# Patient Record
Sex: MALE | Race: WHITE | NOT HISPANIC OR LATINO
[De-identification: names, ages, dates, MRNs, and addresses within clinical notes are randomized per-mention and may not be internally consistent; named-entity substitution may affect disease eponyms.]

---

## 2017-06-06 ENCOUNTER — APPOINTMENT (OUTPATIENT)
Dept: GASTROENTEROLOGY | Facility: CLINIC | Age: 77
End: 2017-06-06

## 2017-06-06 VITALS
OXYGEN SATURATION: 98 % | HEIGHT: 66 IN | HEART RATE: 87 BPM | BODY MASS INDEX: 28.28 KG/M2 | WEIGHT: 176 LBS | DIASTOLIC BLOOD PRESSURE: 70 MMHG | TEMPERATURE: 98.6 F | SYSTOLIC BLOOD PRESSURE: 110 MMHG

## 2017-06-06 DIAGNOSIS — Z86.79 PERSONAL HISTORY OF OTHER DISEASES OF THE CIRCULATORY SYSTEM: ICD-10-CM

## 2017-06-06 DIAGNOSIS — Z95.818 PRESENCE OF OTHER CARDIAC IMPLANTS AND GRAFTS: ICD-10-CM

## 2017-06-06 DIAGNOSIS — K57.92 DIVERTICULITIS OF INTESTINE, PART UNSPECIFIED, W/OUT PERFORATION OR ABSCESS W/OUT BLEEDING: ICD-10-CM

## 2017-06-06 DIAGNOSIS — Z82.3 FAMILY HISTORY OF STROKE: ICD-10-CM

## 2017-06-06 DIAGNOSIS — R14.3 FLATULENCE: ICD-10-CM

## 2017-06-06 RX ORDER — ATORVASTATIN CALCIUM 20 MG/1
20 TABLET, FILM COATED ORAL
Refills: 0 | Status: DISCONTINUED | COMMUNITY

## 2017-07-12 ENCOUNTER — APPOINTMENT (OUTPATIENT)
Dept: GASTROENTEROLOGY | Facility: AMBULATORY MEDICAL SERVICES | Age: 77
End: 2017-07-12

## 2017-07-25 ENCOUNTER — APPOINTMENT (OUTPATIENT)
Dept: GASTROENTEROLOGY | Facility: CLINIC | Age: 77
End: 2017-07-25

## 2017-07-25 VITALS
SYSTOLIC BLOOD PRESSURE: 138 MMHG | TEMPERATURE: 98.5 F | HEIGHT: 66 IN | WEIGHT: 180 LBS | OXYGEN SATURATION: 98 % | HEART RATE: 87 BPM | DIASTOLIC BLOOD PRESSURE: 82 MMHG | BODY MASS INDEX: 28.93 KG/M2

## 2017-10-31 ENCOUNTER — APPOINTMENT (OUTPATIENT)
Dept: POPULATION HEALTH | Facility: CLINIC | Age: 77
End: 2017-10-31
Payer: OTHER MISCELLANEOUS

## 2017-10-31 VITALS
HEIGHT: 66 IN | DIASTOLIC BLOOD PRESSURE: 63 MMHG | WEIGHT: 173 LBS | HEART RATE: 62 BPM | SYSTOLIC BLOOD PRESSURE: 139 MMHG | BODY MASS INDEX: 27.8 KG/M2 | OXYGEN SATURATION: 98 % | RESPIRATION RATE: 19 BRPM | TEMPERATURE: 98 F

## 2017-10-31 PROCEDURE — 94010 BREATHING CAPACITY TEST: CPT

## 2017-10-31 PROCEDURE — 99214 OFFICE O/P EST MOD 30 MIN: CPT | Mod: 25

## 2018-06-12 ENCOUNTER — APPOINTMENT (OUTPATIENT)
Dept: GASTROENTEROLOGY | Facility: CLINIC | Age: 78
End: 2018-06-12
Payer: MEDICARE

## 2018-06-12 VITALS
OXYGEN SATURATION: 97 % | TEMPERATURE: 98.3 F | WEIGHT: 167 LBS | DIASTOLIC BLOOD PRESSURE: 73 MMHG | RESPIRATION RATE: 18 BRPM | SYSTOLIC BLOOD PRESSURE: 128 MMHG | HEART RATE: 65 BPM | BODY MASS INDEX: 26.84 KG/M2 | HEIGHT: 66 IN

## 2018-06-12 DIAGNOSIS — K59.09 OTHER CONSTIPATION: ICD-10-CM

## 2018-06-12 DIAGNOSIS — R19.5 OTHER FECAL ABNORMALITIES: ICD-10-CM

## 2018-06-12 PROCEDURE — 99214 OFFICE O/P EST MOD 30 MIN: CPT

## 2018-06-21 LAB — HEMOCCULT STL QL IA: NEGATIVE

## 2018-10-30 ENCOUNTER — APPOINTMENT (OUTPATIENT)
Dept: POPULATION HEALTH | Facility: CLINIC | Age: 78
End: 2018-10-30
Payer: OTHER MISCELLANEOUS

## 2018-10-30 VITALS
SYSTOLIC BLOOD PRESSURE: 136 MMHG | BODY MASS INDEX: 26.36 KG/M2 | WEIGHT: 164 LBS | OXYGEN SATURATION: 97 % | HEIGHT: 66 IN | DIASTOLIC BLOOD PRESSURE: 63 MMHG | RESPIRATION RATE: 16 BRPM | HEART RATE: 72 BPM | TEMPERATURE: 97.9 F

## 2018-10-30 PROCEDURE — 99214 OFFICE O/P EST MOD 30 MIN: CPT | Mod: 25

## 2018-10-30 PROCEDURE — 94010 BREATHING CAPACITY TEST: CPT

## 2019-04-23 ENCOUNTER — OUTPATIENT (OUTPATIENT)
Dept: OUTPATIENT SERVICES | Facility: HOSPITAL | Age: 79
LOS: 1 days | Discharge: HOME | End: 2019-04-23

## 2019-04-24 DIAGNOSIS — F10.20 ALCOHOL DEPENDENCE, UNCOMPLICATED: ICD-10-CM

## 2019-11-19 ENCOUNTER — APPOINTMENT (OUTPATIENT)
Dept: POPULATION HEALTH | Facility: CLINIC | Age: 79
End: 2019-11-19
Payer: OTHER MISCELLANEOUS

## 2019-11-19 VITALS
SYSTOLIC BLOOD PRESSURE: 157 MMHG | TEMPERATURE: 97.9 F | HEART RATE: 74 BPM | RESPIRATION RATE: 16 BRPM | OXYGEN SATURATION: 98 % | WEIGHT: 168 LBS | BODY MASS INDEX: 27.99 KG/M2 | DIASTOLIC BLOOD PRESSURE: 75 MMHG | HEIGHT: 65 IN

## 2019-11-19 PROCEDURE — 99214 OFFICE O/P EST MOD 30 MIN: CPT | Mod: 25

## 2019-11-19 PROCEDURE — 94010 BREATHING CAPACITY TEST: CPT

## 2019-11-19 NOTE — HISTORY OF PRESENT ILLNESS
[FreeTextEntry1] : Mr. Figueroa returns for his annual visit. Feeling well, walking twice a day and looking forward to his 12 weeks in [place] from January to April. No chest pain, shortness of breath, pleuritic pain, wheezing, change in bowel habits. Has post nasal drip leading him to clear his throat. Has been seen by ENT and given saline spray with minimal results but is only using at night. No other health changes in past year.

## 2019-11-19 NOTE — ASSESSMENT
[FreeTextEntry1] : 79 year old with asbestos exposure here for annual examination. Doing well, active, walking daily and no respiratory complaints. Has cough/clearing of throat related to PND - on saline spray and advised to use it frequently. Has sciatica and is seeing physical therapist. Pulmonary function tests are normal. For CT scan. RTC one year

## 2019-11-19 NOTE — REVIEW OF SYSTEMS
[Nasal Discharge] : nasal discharge [see HPI] : see HPI [Joint Pain] : joint pain [Negative] : Neurological

## 2019-11-19 NOTE — PHYSICAL EXAM
[General Appearance - In No Acute Distress] : in no acute distress [General Appearance - Alert] : alert [General Appearance - Well Nourished] : well nourished [PERRL With Normal Accommodation] : pupils were equal in size, round, and reactive to light [Conjunctival Hyperemia Left] : hyperemia [Extraocular Movements] : extraocular movements were intact [Erythema] : erythema [Exudate] : exudate [Neck Appearance] : the appearance of the neck was normal [Auscultation Breath Sounds / Voice Sounds] : lungs were clear to auscultation bilaterally [] : no respiratory distress [Heart Rate And Rhythm] : heart rate was normal and rhythm regular [Heart Sounds] : normal S1 and S2 [Heart Sounds Gallop] : no gallops [Murmurs] : no murmurs [Heart Sounds Pericardial Friction Rub] : no pericardial rub [Edema] : there was no peripheral edema [Bowel Sounds] : normal bowel sounds [Abdomen Soft] : soft [Abdomen Mass (___ Cm)] : no abdominal mass palpated [Abdomen Tenderness] : non-tender [Cervical Lymph Nodes Enlarged Posterior Bilaterally] : posterior cervical [Cervical Lymph Nodes Enlarged Anterior Bilaterally] : anterior cervical [Left] : on the left [Supraclavicular Lymph Nodes Enlarged Bilaterally] : supraclavicular [Mobile] : mobile [Size ___ cm] : measuring [unfilled]Ucm [Rubbery] : rubbery [Deep Tendon Reflexes (DTR)] : deep tendon reflexes were 2+ and symmetric [Abnormal Walk] : normal gait [Sensation] : the sensory exam was normal to light touch and pinprick [No Focal Deficits] : no focal deficits [Oriented To Time, Place, And Person] : oriented to person, place, and time [Affect] : the affect was normal [Impaired Insight] : insight and judgment were intact [FreeTextEntry1] : some nasal congestion noted, throat is red and irritated due to coughing

## 2019-11-26 ENCOUNTER — APPOINTMENT (OUTPATIENT)
Dept: POPULATION HEALTH | Facility: CLINIC | Age: 79
End: 2019-11-26

## 2020-08-11 ENCOUNTER — EMERGENCY (EMERGENCY)
Facility: HOSPITAL | Age: 80
LOS: 1 days | Discharge: HOME | End: 2020-08-11
Attending: STUDENT IN AN ORGANIZED HEALTH CARE EDUCATION/TRAINING PROGRAM | Admitting: STUDENT IN AN ORGANIZED HEALTH CARE EDUCATION/TRAINING PROGRAM
Payer: MEDICARE

## 2020-08-11 VITALS
HEART RATE: 71 BPM | DIASTOLIC BLOOD PRESSURE: 78 MMHG | RESPIRATION RATE: 18 BRPM | TEMPERATURE: 97 F | SYSTOLIC BLOOD PRESSURE: 173 MMHG | OXYGEN SATURATION: 100 %

## 2020-08-11 VITALS
OXYGEN SATURATION: 100 % | DIASTOLIC BLOOD PRESSURE: 57 MMHG | HEART RATE: 66 BPM | SYSTOLIC BLOOD PRESSURE: 102 MMHG | TEMPERATURE: 98 F | RESPIRATION RATE: 18 BRPM

## 2020-08-11 PROCEDURE — 99284 EMERGENCY DEPT VISIT MOD MDM: CPT | Mod: GC

## 2020-08-11 PROCEDURE — 70450 CT HEAD/BRAIN W/O DYE: CPT | Mod: 26

## 2020-08-11 NOTE — ED PROVIDER NOTE - NS ED ROS FT
Gen: Denies fever, weight loss  HEENT: Denies vision changes, ear pain, epistaxis, sore throat  CV: Denies chest pain, palpitations  Skin: Denies rash, erythema, color changes  Resp: Denies SOB, cough  Endo: Denies sensitivity to heat, cold, increased urination  GI: Denies abdominal pain, constipation, nausea, vomiting, diarrhea  Msk: Denies back pain, LE swelling, extremity pain  : Denies dysuria, increased frequency  Neuro: Denies LOC, weakness, numbness, tingling, HA  Psych: Denies hx of psych, hallucinations  ROS statement: all other ROS negative except as per HPI

## 2020-08-11 NOTE — ED PROVIDER NOTE - CARE PLAN
Principal Discharge DX:	ETOH abuse Principal Discharge DX:	ETOH abuse  Secondary Diagnosis:	Closed head injury

## 2020-08-11 NOTE — ED PROVIDER NOTE - NSFOLLOWUPINSTRUCTIONS_ED_ALL_ED_FT
Alcohol Abuse    Alcohol intoxication occurs when the amount of alcohol that a person has consumed impairs his or her ability to mentally and physically function. Chronic alcohol consumption can also lead to a variety of health issues including neurological disease, stomach disease, heart disease, liver disease, etc. Do not drive after drinking alcohol. Drinking enough alcohol to end up in an Emergency Room suggests you may have an alcohol abuse problem. Seek help at a drug addiction center.    SEEK IMMEDIATE MEDICAL CARE IF YOU HAVE ANY OF THE FOLLOWING SYMPTOMS: seizures, vomiting blood, blood in your stool, lightheadedness/dizziness, or becoming shaky to tremulous when you stop drinking.    Doctor recommendation is to refrain from drinking alcohol and from driving.

## 2020-08-11 NOTE — ED PROVIDER NOTE - PHYSICAL EXAMINATION
PHYSICAL EXAM:  GENERAL: non-toxic appearing; in no respiratory distress; alcohol on breathe  HEAD: Atraumatic, Normocephalic, ecchymosis to Rt cheek.  EYES: PERRL, EOMs intact b/l w/out deficits, no conjunctival pallor  NECK: No JVD; FROM  CHEST/LUNG: CTAB no wheezes/rhonchi/rales  HEART: RRR no murmur/gallops/rubs  ABDOMEN: +BS, soft, NT, ND  EXTREMITIES: No LE edema, +2 radial pulses b/l, +2 DP/PT pulses b/l  MUSCULOSKELETAL: FROM of all 4 extremities  NERVOUS SYSTEM:  Awake & Alert; Oriented to person; No motor deficits or sensory deficits; no focal neurologic deficits  SKIN:  No new rashes

## 2020-08-11 NOTE — ED PROVIDER NOTE - OBJECTIVE STATEMENT
This is a 79 y/o M, PMH of Afib (on eliquis), BIBEMS for ETOH intoxication. Per patient he drank a couple of bottle of wine. He is oriented to person, but not to place or time. He has a bruise to his face, but denies trauma/fall/injury. Claims he doesn't remember much of what happened. He has been drinking alcohol for 50 years now. He lives with his wife. Was given a bolus of IVF via EMS. Denies every going through alcohol withdrawal. Denies every having seizures due to withdrawal. Denies tremors, head injury, fevers, n/v/d, HA, CP, or abd pain.

## 2020-08-11 NOTE — ED ADULT NURSE REASSESSMENT NOTE - NS ED NURSE REASSESS COMMENT FT1
Pt still appears intoxicated. Pt restless, with slurred speech, Pt compliant with safety measures. will continue to monitor.

## 2020-08-11 NOTE — ED ADULT NURSE NOTE - OBJECTIVE STATEMENT
pt received in room 28 intoxicated, unsteady gait, slurred speech. pt brought in by EMS for intoxication found in the street. pt arrives with 18G to his left ac, with fluids running. Pt unable to state how much he had to drink today. pt denies chest pain, sob, discomfort. pt has bruising to his left cheek, states it happened last week unable to recall how it happened.  pt denies SI/HI. MD at bedside evaluating pt. Safety measures in place. will continue to monitor. belongings across from 21.

## 2020-08-11 NOTE — ED PROVIDER NOTE - ATTENDING CONTRIBUTION TO CARE
80M with pmh afib on eliquis, gout presenting for public intoxication BIB EMS. States he had about 4-5 glasses of wine.  is not usually a heavy drinker but has been drinking more often lately. Endorses fall last week with face abrasion in setting of intoxication. Today isnt sure how he got to the hospital. Per spouse over phone, she left separately from home with patient and was supposed to meet her at [facility] today but never showed up. Denies fever, chills, cp, sob, nausea, vomiting, diarrhea, dysuria, headache, weakness, numbness    GEN: NAD, intoxicated   HEENT: NCAT, MMM, normal conjunctiva, perrl  CHEST/LUNGS: Non-tachypneic, CTAB, bilateral breath sounds  CARDIAC: Non-tachycardic, s1s2, normal perfusion, no peripheral edema  ABDOMEN: Soft, NTND, No rebound/guarding  MSK: No joint tenderness, no gross deformity of extremities  SKIN: slight abrasion to right cheek, No rashes, no petechiae, no vesicles  NEURO: CN grossly intact, normal coordination, no focal motor or sensory deficits  PSYCH: Alert, mild intoxication, cooperative. Denies SI/HI/hallucinations     Patient presenting with public intoxication. No signs of acute injury. Some signs of previous head trauma from fall last week. Given AC on board will obtain screening CT head to r/o intracranial bleed.

## 2020-08-11 NOTE — ED ADULT TRIAGE NOTE - CHIEF COMPLAINT QUOTE
PT brought in by EMS from street for ETOH intoxication. PT states was drinking last night had "a couple of bottles of wine". Denies ETOH use today but + AOB. Denies falls, injury or trauma, denies any pain. PT A&Ox4, calm and cooperative in triage. PT arrives with 18 G IV to L Ac with 1 L NS infusing.

## 2020-08-11 NOTE — ED PROVIDER NOTE - PATIENT PORTAL LINK FT
You can access the FollowMyHealth Patient Portal offered by [facility] by registering at the following website: [email or web address]. By joining [employer]s FollowMyHealth portal, you will also be able to view your health information using other applications (apps) compatible with our system.

## 2020-08-11 NOTE — ED PROVIDER NOTE - CLINICAL SUMMARY MEDICAL DECISION MAKING FREE TEXT BOX
81 y/o M, hx of Afib (on eliquis), BIBEMS for ETOH intoxication.  Here VSS. Non-toxic appearing. Is not confused, but still drunk.   Pt on blood thinners and ecchymosis indicates possible head injury. Patient does not remember an injury. Due to this history, will obtain a CT Head.  Otherwise will observe pt and reassess when sober.   If sober and CT Head negative, will likely d/c home.

## 2020-08-11 NOTE — ED PROVIDER NOTE - PROGRESS NOTE DETAILS
Resident: Jace Alvarado - CT Head shows soft tissue swelling, but negative for acute bleed. Patient has sobered up a bit. He is medically cleared to d/c home. Discussed with family and they will come and pick him up.

## 2020-08-11 NOTE — ED ADULT NURSE NOTE - NSIMPLEMENTINTERV_GEN_ALL_ED
Implemented All Fall Risk Interventions:  [place] to call system. Call bell, personal items and telephone within reach. Instruct patient to call for assistance. Room bathroom lighting operational. Non-slip footwear when patient is off stretcher. Physically safe environment: no spills, clutter or unnecessary equipment. Stretcher in lowest position, wheels locked, appropriate side rails in place. Provide visual cue, wrist band, yellow gown, etc. Monitor gait and stability. Monitor for mental status changes and reorient to person, place, and time. Review medications for side effects contributing to fall risk. Reinforce activity limits and safety measures with patient and family.

## 2020-11-17 ENCOUNTER — APPOINTMENT (OUTPATIENT)
Dept: POPULATION HEALTH | Facility: CLINIC | Age: 80
End: 2020-11-17
Payer: OTHER MISCELLANEOUS

## 2020-11-17 VITALS
BODY MASS INDEX: 26.99 KG/M2 | SYSTOLIC BLOOD PRESSURE: 132 MMHG | DIASTOLIC BLOOD PRESSURE: 70 MMHG | HEART RATE: 62 BPM | TEMPERATURE: 96.8 F | WEIGHT: 162 LBS | HEIGHT: 65 IN | RESPIRATION RATE: 15 BRPM | OXYGEN SATURATION: 95 %

## 2020-11-17 DIAGNOSIS — Z23 ENCOUNTER FOR IMMUNIZATION: ICD-10-CM

## 2020-11-17 PROBLEM — M10.9 GOUT, UNSPECIFIED: Chronic | Status: ACTIVE | Noted: 2020-08-11

## 2020-11-17 PROBLEM — I48.91 UNSPECIFIED ATRIAL FIBRILLATION: Chronic | Status: ACTIVE | Noted: 2020-08-11

## 2020-11-17 PROCEDURE — 99213 OFFICE O/P EST LOW 20 MIN: CPT

## 2020-11-17 PROCEDURE — 99072 ADDL SUPL MATRL&STAF TM PHE: CPT

## 2020-11-17 NOTE — PHYSICAL EXAM
[General Appearance - Alert] : alert [General Appearance - In No Acute Distress] : in no acute distress [General Appearance - Well Nourished] : well nourished [PERRL With Normal Accommodation] : pupils were equal in size, round, and reactive to light [Extraocular Movements] : extraocular movements were intact [Conjunctival Hyperemia Left] : hyperemia [Erythema] : erythema [Exudate] : exudate [Neck Appearance] : the appearance of the neck was normal [] : no respiratory distress [Auscultation Breath Sounds / Voice Sounds] : lungs were clear to auscultation bilaterally [Heart Rate And Rhythm] : heart rate was normal and rhythm regular [Heart Sounds] : normal S1 and S2 [Heart Sounds Gallop] : no gallops [Murmurs] : no murmurs [Heart Sounds Pericardial Friction Rub] : no pericardial rub [Edema] : there was no peripheral edema [Bowel Sounds] : normal bowel sounds [Abdomen Soft] : soft [Abdomen Tenderness] : non-tender [Abdomen Mass (___ Cm)] : no abdominal mass palpated [Cervical Lymph Nodes Enlarged Posterior Bilaterally] : posterior cervical [Cervical Lymph Nodes Enlarged Anterior Bilaterally] : anterior cervical [Supraclavicular Lymph Nodes Enlarged Bilaterally] : supraclavicular [Left] : on the left [Size ___ cm] : measuring [unfilled]Ucm [Mobile] : mobile [Rubbery] : rubbery [Abnormal Walk] : normal gait [Deep Tendon Reflexes (DTR)] : deep tendon reflexes were 2+ and symmetric [Sensation] : the sensory exam was normal to light touch and pinprick [No Focal Deficits] : no focal deficits [Oriented To Time, Place, And Person] : oriented to person, place, and time [Impaired Insight] : insight and judgment were intact [Affect] : the affect was normal [FreeTextEntry1] : some nasal congestion noted, throat is red and irritated due to coughing

## 2021-11-16 ENCOUNTER — APPOINTMENT (OUTPATIENT)
Dept: POPULATION HEALTH | Facility: CLINIC | Age: 81
End: 2021-11-16
Payer: OTHER MISCELLANEOUS

## 2021-11-16 VITALS
HEIGHT: 66 IN | DIASTOLIC BLOOD PRESSURE: 72 MMHG | RESPIRATION RATE: 16 BRPM | BODY MASS INDEX: 26.68 KG/M2 | TEMPERATURE: 98.2 F | OXYGEN SATURATION: 98 % | SYSTOLIC BLOOD PRESSURE: 129 MMHG | WEIGHT: 166 LBS | HEART RATE: 72 BPM

## 2021-11-16 PROCEDURE — 99072 ADDL SUPL MATRL&STAF TM PHE: CPT

## 2021-11-16 PROCEDURE — 99214 OFFICE O/P EST MOD 30 MIN: CPT

## 2021-11-16 NOTE — PHYSICAL EXAM
[General Appearance - Alert] : alert [General Appearance - In No Acute Distress] : in no acute distress [General Appearance - Well Nourished] : well nourished [PERRL With Normal Accommodation] : pupils were equal in size, round, and reactive to light [Extraocular Movements] : extraocular movements were intact [Conjunctival Hyperemia Left] : hyperemia [Erythema] : erythema [Exudate] : exudate [Neck Appearance] : the appearance of the neck was normal [] : no respiratory distress [Decreased Breath Sounds] : decreased breath sounds [Heart Rate And Rhythm] : heart rate was normal and rhythm regular [Heart Sounds] : normal S1 and S2 [Heart Sounds Gallop] : no gallops [Murmurs] : no murmurs [Heart Sounds Pericardial Friction Rub] : no pericardial rub [Edema] : there was no peripheral edema [Bowel Sounds] : normal bowel sounds [Abdomen Soft] : soft [Abdomen Tenderness] : non-tender [Abdomen Mass (___ Cm)] : no abdominal mass palpated [Cervical Lymph Nodes Enlarged Anterior Bilaterally] : anterior cervical [Supraclavicular Lymph Nodes Enlarged Bilaterally] : supraclavicular [Axillary Lymph Nodes Enlarged Bilaterally] : axillary [Left] : on the left [Size ___ cm] : measuring [unfilled]Ucm [Mobile] : mobile [Rubbery] : rubbery [Abnormal Walk] : normal gait [Deep Tendon Reflexes (DTR)] : deep tendon reflexes were 2+ and symmetric [Sensation] : the sensory exam was normal to light touch and pinprick [No Focal Deficits] : no focal deficits [Oriented To Time, Place, And Person] : oriented to person, place, and time [Impaired Insight] : insight and judgment were intact [Affect] : the affect was normal [FreeTextEntry1] : decreased memory with repetition

## 2021-11-16 NOTE — HISTORY OF PRESENT ILLNESS
[FreeTextEntry1] : returns for followup visit. Had CT scan last year and stable. Has had appendectomy in past year, recently had loop recorder removed. Is having cognitive loss with short term memory impairment, worsening. No complaints of shortness of breath and remains physically active. Continues to mow lawn, lift weights, goes for walks all without issue. Has persistent throat clearing, cough x years. No other complaints.

## 2022-05-10 ENCOUNTER — APPOINTMENT (OUTPATIENT)
Dept: GASTROENTEROLOGY | Facility: CLINIC | Age: 82
End: 2022-05-10
Payer: MEDICARE

## 2022-05-10 VITALS
TEMPERATURE: 98.2 F | HEART RATE: 64 BPM | BODY MASS INDEX: 26.68 KG/M2 | WEIGHT: 166 LBS | OXYGEN SATURATION: 97 % | HEIGHT: 66 IN | SYSTOLIC BLOOD PRESSURE: 153 MMHG | DIASTOLIC BLOOD PRESSURE: 79 MMHG

## 2022-05-10 VITALS
WEIGHT: 166 LBS | HEIGHT: 66 IN | DIASTOLIC BLOOD PRESSURE: 79 MMHG | BODY MASS INDEX: 26.68 KG/M2 | SYSTOLIC BLOOD PRESSURE: 153 MMHG | OXYGEN SATURATION: 97 % | TEMPERATURE: 98.2 F | HEART RATE: 64 BPM

## 2022-05-10 DIAGNOSIS — I48.91 UNSPECIFIED ATRIAL FIBRILLATION: ICD-10-CM

## 2022-05-10 DIAGNOSIS — Z12.11 ENCOUNTER FOR SCREENING FOR MALIGNANT NEOPLASM OF COLON: ICD-10-CM

## 2022-05-10 DIAGNOSIS — Z01.812 ENCOUNTER FOR PREPROCEDURAL LABORATORY EXAMINATION: ICD-10-CM

## 2022-05-10 DIAGNOSIS — Z86.39 PERSONAL HISTORY OF OTHER ENDOCRINE, NUTRITIONAL AND METABOLIC DISEASE: ICD-10-CM

## 2022-05-10 DIAGNOSIS — Z20.822 ENCOUNTER FOR PREPROCEDURAL LABORATORY EXAMINATION: ICD-10-CM

## 2022-05-10 DIAGNOSIS — Z12.12 ENCOUNTER FOR SCREENING FOR MALIGNANT NEOPLASM OF COLON: ICD-10-CM

## 2022-05-10 DIAGNOSIS — R09.82 POSTNASAL DRIP: ICD-10-CM

## 2022-05-10 DIAGNOSIS — K21.9 GASTRO-ESOPHAGEAL REFLUX DISEASE W/OUT ESOPHAGITIS: ICD-10-CM

## 2022-05-10 DIAGNOSIS — K22.70 BARRETT'S ESOPHAGUS W/OUT DYSPLASIA: ICD-10-CM

## 2022-05-10 PROCEDURE — 99204 OFFICE O/P NEW MOD 45 MIN: CPT

## 2022-05-10 RX ORDER — ALPRAZOLAM 0.25 MG/1
0.25 TABLET ORAL
Qty: 90 | Refills: 0 | Status: DISCONTINUED | COMMUNITY
Start: 2018-05-09 | End: 2022-05-10

## 2022-05-10 RX ORDER — CHROMIUM 200 MCG
TABLET ORAL
Refills: 0 | Status: ACTIVE | COMMUNITY

## 2022-05-10 RX ORDER — VERAPAMIL HYDROCHLORIDE 240 MG/1
240 TABLET ORAL
Qty: 45 | Refills: 0 | Status: DISCONTINUED | COMMUNITY
Start: 2018-04-07 | End: 2022-05-10

## 2022-05-10 RX ORDER — ATORVASTATIN CALCIUM 20 MG/1
20 TABLET, FILM COATED ORAL
Refills: 0 | Status: ACTIVE | COMMUNITY

## 2022-05-10 RX ORDER — VERAPAMIL HYDROCHLORIDE 120 MG/1
120 TABLET ORAL
Refills: 0 | Status: ACTIVE | COMMUNITY

## 2022-05-10 RX ORDER — APIXABAN 5 MG/1
5 TABLET, FILM COATED ORAL
Qty: 1 | Refills: 0 | Status: ACTIVE | COMMUNITY
Start: 2022-05-10

## 2022-05-10 RX ORDER — BENZOCAINE/BENZALKONIUM/ALOE/E 5 %-0.13 %
10 CREAM (GRAM) TOPICAL
Refills: 0 | Status: ACTIVE | COMMUNITY

## 2022-05-10 RX ORDER — AMITRIPTYLINE HYDROCHLORIDE 10 MG/1
10 TABLET, FILM COATED ORAL
Qty: 30 | Refills: 0 | Status: DISCONTINUED | COMMUNITY
Start: 2018-05-14 | End: 2022-05-10

## 2022-05-10 NOTE — PHYSICAL EXAM
[Sclera] : the sclera and conjunctiva were normal [PERRL With Normal Accommodation] : pupils were equal in size, round, and reactive to light [Extraocular Movements] : extraocular movements were intact [Outer Ear] : the ears and nose were normal in appearance [Neck Appearance] : the appearance of the neck was normal [Neck Cervical Mass (___cm)] : no neck mass was observed [Jugular Venous Distention Increased] : there was no jugular-venous distention [Thyroid Diffuse Enlargement] : the thyroid was not enlarged [Thyroid Nodule] : there were no palpable thyroid nodules [Auscultation Breath Sounds / Voice Sounds] : lungs were clear to auscultation bilaterally [Heart Rate And Rhythm] : heart rate was normal and rhythm regular [Heart Sounds] : normal S1 and S2 [Heart Sounds Gallop] : no gallops [Murmurs] : no murmurs [Heart Sounds Pericardial Friction Rub] : no pericardial rub [Bowel Sounds] : normal bowel sounds [Abdomen Soft] : soft [Abdomen Tenderness] : non-tender [] : no hepato-splenomegaly [Abdomen Mass (___ Cm)] : no abdominal mass palpated [Cervical Lymph Nodes Enlarged Posterior Bilaterally] : posterior cervical [Cervical Lymph Nodes Enlarged Anterior Bilaterally] : anterior cervical [Supraclavicular Lymph Nodes Enlarged Bilaterally] : supraclavicular [No CVA Tenderness] : no ~M costovertebral angle tenderness [No Spinal Tenderness] : no spinal tenderness [Abnormal Walk] : normal gait [Nail Clubbing] : no clubbing  or cyanosis of the fingernails [Musculoskeletal - Swelling] : no joint swelling seen [Motor Tone] : muscle strength and tone were normal [Deep Tendon Reflexes (DTR)] : deep tendon reflexes were 2+ and symmetric [Sensation] : the sensory exam was normal to light touch and pinprick [No Focal Deficits] : no focal deficits [Oriented To Time, Place, And Person] : oriented to person, place, and time [Impaired Insight] : insight and judgment were intact [Affect] : the affect was normal

## 2022-05-10 NOTE — ASSESSMENT
[FreeTextEntry1] : Chronic cough possibly due to acid reflux.  History of Davis's esophagus.  Dietary and lifestyle modification discussed with the patient.  Start omeprazole 40 mg a day.  EGD scheduled.\par Screening colonoscopy in this high risk patient with a history of colon polyps.  The importance of colon cancer screening was discussed with the patient he understands and all questions were answered.\par

## 2022-05-10 NOTE — HISTORY OF PRESENT ILLNESS
[Heartburn] : denies heartburn [Nausea] : denies nausea [Vomiting] : denies vomiting [Diarrhea] : denies diarrhea [Constipation] : denies constipation [Yellow Skin Or Eyes (Jaundice)] : denies jaundice [Abdominal Pain] : denies abdominal pain [Abdominal Swelling] : denies abdominal swelling [Rectal Pain] : denies rectal pain [GERD] : gastroesophageal reflux disease [Peptic Ulcer Disease] : peptic ulcer disease [Diverticulitis] : diverticulitis [Appendectomy] : appendectomy [Wt Gain ___ Lbs] : no recent weight gain [Wt Loss ___ Lbs] : no recent weight loss [Hiatus Hernia] : no hiatus hernia [Pancreatitis] : no pancreatitis [Cholelithiasis] : no cholelithiasis [Kidney Stone] : no kidney stone [Inflammatory Bowel Disease] : no inflammatory bowel disease [Irritable Bowel Syndrome] : no irritable bowel syndrome [Alcohol Abuse] : no alcohol abuse [Malignancy] : no malignancy [Abdominal Surgery] : no abdominal surgery [Cholecystectomy] : no cholecystectomy [de-identified] : 81-year-old man complains of constant throat clearing with intermittent cough.  He underwent an ENT evaluation and was told this problem may be due to acid reflux and regurgitation.  He denies any overt heartburn.  He has not been on a PPI.  He denies rectal bleeding, melena or hematemesis.  He is currently on Eliquis for atrial fibrillation.  His most recent colonoscopy was in 2014 however he does have a history of colon polyps in the past.  EGD was done in 2017 and he has a history of Davis's esophagus.

## 2022-06-09 ENCOUNTER — NON-APPOINTMENT (OUTPATIENT)
Age: 82
End: 2022-06-09

## 2022-06-17 LAB — HEMOCCULT STL QL IA: POSITIVE

## 2022-07-19 ENCOUNTER — LABORATORY RESULT (OUTPATIENT)
Age: 82
End: 2022-07-19

## 2022-07-22 ENCOUNTER — RESULT REVIEW (OUTPATIENT)
Age: 82
End: 2022-07-22

## 2022-07-22 ENCOUNTER — APPOINTMENT (OUTPATIENT)
Dept: GASTROENTEROLOGY | Facility: HOSPITAL | Age: 82
End: 2022-07-22

## 2022-07-22 ENCOUNTER — OUTPATIENT (OUTPATIENT)
Dept: OUTPATIENT SERVICES | Facility: HOSPITAL | Age: 82
LOS: 1 days | Discharge: HOME | End: 2022-07-22

## 2022-07-22 VITALS
OXYGEN SATURATION: 99 % | TEMPERATURE: 98 F | HEART RATE: 76 BPM | SYSTOLIC BLOOD PRESSURE: 139 MMHG | HEIGHT: 67 IN | RESPIRATION RATE: 20 BRPM | DIASTOLIC BLOOD PRESSURE: 92 MMHG | WEIGHT: 164.91 LBS

## 2022-07-22 VITALS
HEART RATE: 62 BPM | OXYGEN SATURATION: 97 % | DIASTOLIC BLOOD PRESSURE: 94 MMHG | SYSTOLIC BLOOD PRESSURE: 126 MMHG | RESPIRATION RATE: 15 BRPM

## 2022-07-22 DIAGNOSIS — R19.7 DIARRHEA, UNSPECIFIED: ICD-10-CM

## 2022-07-22 PROCEDURE — 45380 COLONOSCOPY AND BIOPSY: CPT | Mod: PT

## 2022-07-22 PROCEDURE — 43239 EGD BIOPSY SINGLE/MULTIPLE: CPT | Mod: 59

## 2022-07-22 PROCEDURE — 88305 TISSUE EXAM BY PATHOLOGIST: CPT | Mod: 26

## 2022-07-22 DEVICE — SYR ORISE GEL TWIN 23G 10ML: Type: IMPLANTABLE DEVICE | Status: FUNCTIONAL

## 2022-07-22 RX ORDER — SODIUM CHLORIDE 9 MG/ML
500 INJECTION, SOLUTION INTRAVENOUS
Refills: 0 | Status: DISCONTINUED | OUTPATIENT
Start: 2022-07-22 | End: 2022-08-05

## 2022-07-22 NOTE — ASU PREOP CHECKLIST - IDENTIFICATION BAND VERIFIED
Problem: Patient Care Overview  Goal: Plan of Care/Patient Progress Review  Outcome: Improving  POD # 4 s/p right superficial parotidectomy and right facial nerve repair. Right incision open to air, incision sutures approximated with no drainage noted. Jaw bra on. LUANNE x 1 right neck to bulb suction with 10ml out HS. VSS. Neuros intact. Alert and oriented x4. D/o to place at times. Coherent and logical. Did not initiate hepatic encephalopathy protocol HS. Had 3 BM.  at 0200. PIV SL. Regular diet. Up independently. PICC to be placed today and education done on PICC @1100. Continue to monitor.         done

## 2022-07-22 NOTE — ASU PREOP CHECKLIST - NEEDLE GAUGE
22 Thank you for the opportunity to assist with the care of this patient.   [facility] [phone].

## 2022-07-26 LAB — SURGICAL PATHOLOGY STUDY: SIGNIFICANT CHANGE UP

## 2022-07-28 ENCOUNTER — NON-APPOINTMENT (OUTPATIENT)
Age: 82
End: 2022-07-28

## 2022-07-28 DIAGNOSIS — D12.6 BENIGN NEOPLASM OF COLON, UNSPECIFIED: ICD-10-CM

## 2022-07-29 ENCOUNTER — APPOINTMENT (OUTPATIENT)
Dept: GASTROENTEROLOGY | Facility: CLINIC | Age: 82
End: 2022-07-29

## 2022-07-29 DIAGNOSIS — K63.5 POLYP OF COLON: ICD-10-CM

## 2022-07-29 PROCEDURE — 99443: CPT | Mod: 95

## 2022-08-01 RX ORDER — SODIUM FLUORIDE 6 MG/ML
1.1 PASTE DENTAL
Qty: 100 | Refills: 0 | Status: ACTIVE | COMMUNITY
Start: 2022-07-18

## 2022-08-01 RX ORDER — METOPROLOL SUCCINATE 25 MG/1
25 TABLET, EXTENDED RELEASE ORAL
Qty: 135 | Refills: 0 | Status: ACTIVE | COMMUNITY
Start: 2022-02-28

## 2022-08-16 ENCOUNTER — APPOINTMENT (OUTPATIENT)
Dept: GASTROENTEROLOGY | Facility: CLINIC | Age: 82
End: 2022-08-16

## 2022-10-25 ENCOUNTER — OUTPATIENT (OUTPATIENT)
Dept: OUTPATIENT SERVICES | Facility: HOSPITAL | Age: 82
LOS: 1 days | End: 2022-10-25

## 2022-10-25 VITALS
HEART RATE: 69 BPM | SYSTOLIC BLOOD PRESSURE: 121 MMHG | HEIGHT: 65 IN | TEMPERATURE: 97 F | OXYGEN SATURATION: 97 % | DIASTOLIC BLOOD PRESSURE: 84 MMHG | WEIGHT: 177.91 LBS | RESPIRATION RATE: 15 BRPM

## 2022-10-25 DIAGNOSIS — Z98.890 OTHER SPECIFIED POSTPROCEDURAL STATES: Chronic | ICD-10-CM

## 2022-10-25 DIAGNOSIS — K63.5 POLYP OF COLON: ICD-10-CM

## 2022-10-25 DIAGNOSIS — I48.91 UNSPECIFIED ATRIAL FIBRILLATION: ICD-10-CM

## 2022-10-25 DIAGNOSIS — Z90.49 ACQUIRED ABSENCE OF OTHER SPECIFIED PARTS OF DIGESTIVE TRACT: Chronic | ICD-10-CM

## 2022-10-25 DIAGNOSIS — M10.9 GOUT, UNSPECIFIED: ICD-10-CM

## 2022-10-25 LAB
ALBUMIN SERPL ELPH-MCNC: 4.2 G/DL — SIGNIFICANT CHANGE UP (ref 3.3–5)
ALP SERPL-CCNC: 60 U/L — SIGNIFICANT CHANGE UP (ref 40–120)
ALT FLD-CCNC: 17 U/L — SIGNIFICANT CHANGE UP (ref 4–41)
ANION GAP SERPL CALC-SCNC: 12 MMOL/L — SIGNIFICANT CHANGE UP (ref 7–14)
AST SERPL-CCNC: 23 U/L — SIGNIFICANT CHANGE UP (ref 4–40)
BILIRUB SERPL-MCNC: 0.4 MG/DL — SIGNIFICANT CHANGE UP (ref 0.2–1.2)
BUN SERPL-MCNC: 10 MG/DL — SIGNIFICANT CHANGE UP (ref 7–23)
CALCIUM SERPL-MCNC: 8.9 MG/DL — SIGNIFICANT CHANGE UP (ref 8.4–10.5)
CHLORIDE SERPL-SCNC: 102 MMOL/L — SIGNIFICANT CHANGE UP (ref 98–107)
CO2 SERPL-SCNC: 25 MMOL/L — SIGNIFICANT CHANGE UP (ref 22–31)
CREAT SERPL-MCNC: 0.83 MG/DL — SIGNIFICANT CHANGE UP (ref 0.5–1.3)
EGFR: 87 ML/MIN/1.73M2 — SIGNIFICANT CHANGE UP
GLUCOSE SERPL-MCNC: 102 MG/DL — HIGH (ref 70–99)
HCT VFR BLD CALC: 40.8 % — SIGNIFICANT CHANGE UP (ref 39–50)
HGB BLD-MCNC: 13.3 G/DL — SIGNIFICANT CHANGE UP (ref 13–17)
MCHC RBC-ENTMCNC: 29.9 PG — SIGNIFICANT CHANGE UP (ref 27–34)
MCHC RBC-ENTMCNC: 32.6 GM/DL — SIGNIFICANT CHANGE UP (ref 32–36)
MCV RBC AUTO: 91.7 FL — SIGNIFICANT CHANGE UP (ref 80–100)
NRBC # BLD: 0 /100 WBCS — SIGNIFICANT CHANGE UP (ref 0–0)
NRBC # FLD: 0 K/UL — SIGNIFICANT CHANGE UP (ref 0–0)
PLATELET # BLD AUTO: 292 K/UL — SIGNIFICANT CHANGE UP (ref 150–400)
POTASSIUM SERPL-MCNC: 4.2 MMOL/L — SIGNIFICANT CHANGE UP (ref 3.5–5.3)
POTASSIUM SERPL-SCNC: 4.2 MMOL/L — SIGNIFICANT CHANGE UP (ref 3.5–5.3)
PROT SERPL-MCNC: 6.2 G/DL — SIGNIFICANT CHANGE UP (ref 6–8.3)
RBC # BLD: 4.45 M/UL — SIGNIFICANT CHANGE UP (ref 4.2–5.8)
RBC # FLD: 12.9 % — SIGNIFICANT CHANGE UP (ref 10.3–14.5)
SODIUM SERPL-SCNC: 139 MMOL/L — SIGNIFICANT CHANGE UP (ref 135–145)
WBC # BLD: 8.5 K/UL — SIGNIFICANT CHANGE UP (ref 3.8–10.5)
WBC # FLD AUTO: 8.5 K/UL — SIGNIFICANT CHANGE UP (ref 3.8–10.5)

## 2022-10-25 PROCEDURE — 93010 ELECTROCARDIOGRAM REPORT: CPT

## 2022-10-25 RX ORDER — SODIUM CHLORIDE 9 MG/ML
1000 INJECTION, SOLUTION INTRAVENOUS
Refills: 0 | Status: DISCONTINUED | OUTPATIENT
Start: 2022-11-03 | End: 2022-11-17

## 2022-10-25 NOTE — H&P PST ADULT - HISTORY OF PRESENT ILLNESS
82 year old male with PMH of Davis's esophagus, Afib on Eliquis Glaucoma, HLD, presents to PST, with pre op diagnosis - polyp of colon, for pre op evaluation prior to scheduled surgery- colonoscopy endomucosal resection with Dr Grady

## 2022-10-25 NOTE — H&P PST ADULT - PROBLEM SELECTOR PLAN 1
Patient is tentatively scheduled for surgery- colonoscopy endomucosal resection with Dr Grady on 11/03/2022.    Pre-op instructions provided. Pt given verbal and written instructions with teach back on pt own omeprazole. Pt verbalized understanding with return demonstration.    Routine Covid PCR test ordered .Instructions regarding covid PCR test and locations for covid testing site provided. Pt verbalized understanding    IVETH precautions

## 2022-10-25 NOTE — H&P PST ADULT - PROBLEM SELECTOR PLAN 2
Patient with PMH of HTN, Afib- on Eliquis, CAD, advanced age, -patient has appointment on 10/28/2022 with cardiologist prior to surgery- will request copy of it and pre op Eliquis instructions.    EKG in chart.  Will request ECHO results.  Patient instructed to take metoprolol  with a sip of water on the morning of procedure.

## 2022-10-25 NOTE — H&P PST ADULT - MUSCULOSKELETAL
decreased ROM/strength 5/5 bilateral upper extremities/strength 5/5 bilateral lower extremities/abnormal gait details…

## 2022-10-25 NOTE — H&P PST ADULT - NSICDXPASTSURGICALHX_GEN_ALL_CORE_FT
PAST SURGICAL HISTORY:  H/O hernia repair     S/P appendectomy 12/26/2020    S/P laser trabeculoplasty of eye

## 2022-10-25 NOTE — H&P PST ADULT - NSICDXPASTMEDICALHX_GEN_ALL_CORE_FT
PAST MEDICAL HISTORY:  Afib     Barretts esophagus     Glaucoma     Gout     H/O asbestosis     History of short term memory loss follows up with neurologist    Hypercholesteremia     Hypertension

## 2022-10-25 NOTE — H&P PST ADULT - OTHER CARE PROVIDERS
cardiologist- Dr Nam Steele - [phone], pulmonologist- Dr Neida Cain  neurologist Dr. Mcfarlane

## 2022-10-25 NOTE — H&P PST ADULT - GASTROINTESTINAL COMMENTS
pre op diagnosis- polyps of colon pt' wife reports that he had colonoscopy in 07/2022- found polyps- now plan for polyp removal

## 2022-11-01 LAB — SARS-COV-2 N GENE NPH QL NAA+PROBE: NOT DETECTED

## 2022-11-03 ENCOUNTER — APPOINTMENT (OUTPATIENT)
Dept: GASTROENTEROLOGY | Facility: HOSPITAL | Age: 82
End: 2022-11-03

## 2022-11-03 ENCOUNTER — OUTPATIENT (OUTPATIENT)
Dept: OUTPATIENT SERVICES | Facility: HOSPITAL | Age: 82
LOS: 1 days | Discharge: HOME | End: 2022-11-03

## 2022-11-03 VITALS
SYSTOLIC BLOOD PRESSURE: 123 MMHG | RESPIRATION RATE: 14 BRPM | OXYGEN SATURATION: 97 % | HEIGHT: 65 IN | WEIGHT: 173.06 LBS | TEMPERATURE: 98 F | DIASTOLIC BLOOD PRESSURE: 63 MMHG | HEART RATE: 77 BPM

## 2022-11-03 VITALS
HEART RATE: 63 BPM | OXYGEN SATURATION: 97 % | SYSTOLIC BLOOD PRESSURE: 110 MMHG | DIASTOLIC BLOOD PRESSURE: 61 MMHG | RESPIRATION RATE: 23 BRPM

## 2022-11-03 DIAGNOSIS — Z98.890 OTHER SPECIFIED POSTPROCEDURAL STATES: Chronic | ICD-10-CM

## 2022-11-03 DIAGNOSIS — Z90.49 ACQUIRED ABSENCE OF OTHER SPECIFIED PARTS OF DIGESTIVE TRACT: Chronic | ICD-10-CM

## 2022-11-03 DIAGNOSIS — K63.5 POLYP OF COLON: ICD-10-CM

## 2022-11-03 PROCEDURE — 45378 DIAGNOSTIC COLONOSCOPY: CPT | Mod: GC

## 2022-11-03 RX ORDER — SODIUM CHLORIDE 9 MG/ML
500 INJECTION, SOLUTION INTRAVENOUS
Refills: 0 | Status: DISCONTINUED | OUTPATIENT
Start: 2022-11-03 | End: 2022-11-17

## 2022-11-03 NOTE — ASU PATIENT PROFILE, ADULT - FALL HARM RISK - UNIVERSAL INTERVENTIONS
Bed in lowest position, wheels locked, appropriate side rails in place/Call bell, personal items and telephone in reach/Instruct patient to call for assistance before getting out of bed or chair/Non-slip footwear when patient is out of bed/[place] to call system/Physically safe environment - no spills, clutter or unnecessary equipment/Purposeful Proactive Rounding/Room/bathroom lighting operational, light cord in reach

## 2022-11-09 ENCOUNTER — NON-APPOINTMENT (OUTPATIENT)
Age: 82
End: 2022-11-09

## 2022-11-15 ENCOUNTER — APPOINTMENT (OUTPATIENT)
Dept: POPULATION HEALTH | Facility: CLINIC | Age: 82
End: 2022-11-15

## 2022-11-15 VITALS
OXYGEN SATURATION: 99 % | SYSTOLIC BLOOD PRESSURE: 144 MMHG | DIASTOLIC BLOOD PRESSURE: 86 MMHG | BODY MASS INDEX: 27.97 KG/M2 | WEIGHT: 174 LBS | HEIGHT: 66 IN | RESPIRATION RATE: 15 BRPM | TEMPERATURE: 97.7 F | HEART RATE: 60 BPM

## 2022-11-15 PROBLEM — E78.00 PURE HYPERCHOLESTEROLEMIA, UNSPECIFIED: Chronic | Status: ACTIVE | Noted: 2022-10-25

## 2022-11-15 PROBLEM — I10 ESSENTIAL (PRIMARY) HYPERTENSION: Chronic | Status: ACTIVE | Noted: 2022-10-25

## 2022-11-15 PROBLEM — H40.9 UNSPECIFIED GLAUCOMA: Chronic | Status: ACTIVE | Noted: 2022-10-25

## 2022-11-15 PROBLEM — Z87.898 PERSONAL HISTORY OF OTHER SPECIFIED CONDITIONS: Chronic | Status: ACTIVE | Noted: 2022-10-25

## 2022-11-15 PROBLEM — Z87.09 PERSONAL HISTORY OF OTHER DISEASES OF THE RESPIRATORY SYSTEM: Chronic | Status: ACTIVE | Noted: 2022-10-25

## 2022-11-15 PROBLEM — K22.70 BARRETT'S ESOPHAGUS WITHOUT DYSPLASIA: Chronic | Status: ACTIVE | Noted: 2022-10-25

## 2022-11-15 PROCEDURE — 94010 BREATHING CAPACITY TEST: CPT | Mod: NC

## 2022-11-15 PROCEDURE — 99213 OFFICE O/P EST LOW 20 MIN: CPT | Mod: 25

## 2022-11-15 PROCEDURE — 99072 ADDL SUPL MATRL&STAF TM PHE: CPT

## 2022-11-15 RX ORDER — VITAMIN B COMPLEX
TABLET ORAL
Refills: 0 | Status: DISCONTINUED | COMMUNITY
End: 2022-11-15

## 2022-11-15 RX ORDER — TRIAMCINOLONE ACETONIDE 5 MG/G
0.5 CREAM TOPICAL
Qty: 15 | Refills: 0 | Status: DISCONTINUED | COMMUNITY
Start: 2022-02-01 | End: 2022-11-15

## 2022-11-15 RX ORDER — METOPROLOL TARTRATE 75 MG/1
TABLET, FILM COATED ORAL
Refills: 0 | Status: DISCONTINUED | COMMUNITY
End: 2022-11-15

## 2022-11-15 RX ORDER — PREDNISONE 10 MG/1
10 TABLET ORAL
Qty: 42 | Refills: 0 | Status: DISCONTINUED | COMMUNITY
Start: 2022-05-31 | End: 2022-11-15

## 2022-11-15 RX ORDER — MEMANTINE HYDROCHLORIDE AND DONEPEZIL HYDROCHLORIDE 14; 10 MG/1; MG/1
14-10 CAPSULE ORAL
Qty: 30 | Refills: 0 | Status: DISCONTINUED | COMMUNITY
Start: 2018-05-14 | End: 2022-11-15

## 2022-11-15 RX ORDER — SODIUM SULFATE, POTASSIUM SULFATE, MAGNESIUM SULFATE 17.5; 3.13; 1.6 G/ML; G/ML; G/ML
17.5-3.13-1.6 SOLUTION, CONCENTRATE ORAL
Qty: 1 | Refills: 0 | Status: DISCONTINUED | COMMUNITY
Start: 2022-07-29 | End: 2022-11-15

## 2022-11-15 RX ORDER — CHROMIUM 200 MCG
TABLET ORAL
Refills: 0 | Status: DISCONTINUED | COMMUNITY
End: 2022-11-15

## 2022-11-15 RX ORDER — ECONAZOLE NITRATE 10 MG/G
1 CREAM TOPICAL
Qty: 60 | Refills: 0 | Status: DISCONTINUED | COMMUNITY
Start: 2022-02-15 | End: 2022-11-15

## 2022-11-15 RX ORDER — SODIUM SULFATE, POTASSIUM SULFATE, MAGNESIUM SULFATE 17.5; 3.13; 1.6 G/ML; G/ML; G/ML
17.5-3.13-1.6 SOLUTION, CONCENTRATE ORAL
Qty: 1 | Refills: 0 | Status: DISCONTINUED | COMMUNITY
Start: 2022-05-10 | End: 2022-11-15

## 2022-11-15 RX ORDER — OMEPRAZOLE 40 MG/1
40 CAPSULE, DELAYED RELEASE ORAL
Qty: 30 | Refills: 0 | Status: DISCONTINUED | COMMUNITY
Start: 2018-04-09 | End: 2022-11-15

## 2022-11-15 RX ORDER — TRIAMCINOLONE ACETONIDE 1 MG/G
0.1 CREAM TOPICAL
Qty: 454 | Refills: 0 | Status: DISCONTINUED | COMMUNITY
Start: 2022-05-31 | End: 2022-11-15

## 2022-11-15 RX ORDER — VERAPAMIL HYDROCHLORIDE 120 MG/1
120 TABLET ORAL
Qty: 90 | Refills: 0 | Status: DISCONTINUED | COMMUNITY
Start: 2022-06-16 | End: 2022-11-15

## 2022-11-15 RX ORDER — AMLODIPINE BESYLATE 5 MG/1
5 TABLET ORAL
Refills: 0 | Status: ACTIVE | COMMUNITY

## 2022-11-15 RX ORDER — MUPIROCIN 20 MG/G
2 OINTMENT TOPICAL
Qty: 22 | Refills: 0 | Status: DISCONTINUED | COMMUNITY
Start: 2022-02-01 | End: 2022-11-15

## 2022-11-15 NOTE — PLAN
[FreeTextEntry1] : Yearly medical survelllance with CT scan and PFT [FreeTextEntry2] : n/a [FreeTextEntry3] : fair [FreeTextEntry4] : 1 year

## 2022-11-15 NOTE — ASSESSMENT
[Indicate if, in your opinion, the incident that the patient described was the competent medical cause of this injury/illness.] : The incident that the patient described was the competent medical cause of this injury/illness: Yes [Indicate if the patient's complaints are consistent with his/her history of the injury/illness.] : Indicate if the patient's complaints are consistent with his/her history of the injury/illness: Yes [N/A] : Not applicable [Can the patient return to usual work activities as indicated? If yes, indicate date___] : The patient cannot return to usual work activities as indicated. [FreeTextEntry1] : Long history of direct exposure to asbestos with exertional shortness of breath. CT currently is stable.\par \par Has chronic cough/LPR? on omeprazole for Davis's without much improvement. Has cognitive impairment with short term memory deficit. Has no complaints, but is slowing down.  [FreeTextEntry2] : asbestos exposure daily with latency [FreeTextEntry3] : exertional SOB [FreeTextEntry5] : 100% [FreeTextEntry6] : Patient is retired and incapable of working

## 2022-11-15 NOTE — HISTORY OF PRESENT ILLNESS
[No] : The patient is currently not working. 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

## 2022-11-15 NOTE — PHYSICAL EXAM
[General Appearance - Well Nourished] : well nourished [General Appearance - Well Developed] : well developed [Sclera] : the sclera and conjunctiva were normal [PERRL With Normal Accommodation] : pupils were equal in size, round, and reactive to light [Nasal Cavity] : the nasal mucosa and septum were normal [Oropharynx] : the oropharynx was normal [Neck Appearance] : the appearance of the neck was normal [Auscultation Breath Sounds / Voice Sounds] : lungs were clear to auscultation bilaterally [Heart Rate And Rhythm] : heart rate was normal and rhythm regular [Heart Sounds Gallop] : no gallops [Murmurs] : no murmurs [Arterial Pulses Carotid] : carotid pulses were normal with no bruits [Full Pulse] : the pedal pulses are present [Edema] : there was no peripheral edema [Bowel Sounds] : normal bowel sounds [Abdomen Soft] : soft [Abdomen Tenderness] : non-tender [] : no hepato-splenomegaly

## 2022-11-15 NOTE — PROCEDURE
[FreeTextEntry1] : Pulmonary function tests show a decreased from 100% of predicted in 2019 to 85% of predicted in 2022 (FVC)

## 2022-11-15 NOTE — REVIEW OF SYSTEMS
[As Noted in HPI] : as noted in HPI [Negative] : Gastrointestinal [de-identified] : cognitive impairment

## 2022-11-22 ENCOUNTER — OUTPATIENT (OUTPATIENT)
Dept: OUTPATIENT SERVICES | Facility: HOSPITAL | Age: 82
LOS: 1 days | End: 2022-11-22
Payer: COMMERCIAL

## 2022-11-22 ENCOUNTER — APPOINTMENT (OUTPATIENT)
Dept: CT IMAGING | Facility: IMAGING CENTER | Age: 82
End: 2022-11-22

## 2022-11-22 DIAGNOSIS — Z77.090 CONTACT WITH AND (SUSPECTED) EXPOSURE TO ASBESTOS: ICD-10-CM

## 2022-11-22 PROCEDURE — 71250 CT THORAX DX C-: CPT | Mod: 26

## 2022-11-22 PROCEDURE — 71250 CT THORAX DX C-: CPT

## 2022-12-19 ENCOUNTER — RX RENEWAL (OUTPATIENT)
Age: 82
End: 2022-12-19

## 2022-12-31 NOTE — ED PROVIDER NOTE - PSH
No significant past surgical history
Patient requests all Lab, Cardiology, and Radiology Results on their Discharge Instructions

## 2023-03-13 ENCOUNTER — RX RENEWAL (OUTPATIENT)
Age: 83
End: 2023-03-13

## 2023-05-07 ENCOUNTER — RX RENEWAL (OUTPATIENT)
Age: 83
End: 2023-05-07

## 2023-09-01 ENCOUNTER — RX RENEWAL (OUTPATIENT)
Age: 83
End: 2023-09-01

## 2023-11-28 ENCOUNTER — APPOINTMENT (OUTPATIENT)
Age: 83
End: 2023-11-28
Payer: OTHER MISCELLANEOUS

## 2023-11-28 VITALS
WEIGHT: 180 LBS | DIASTOLIC BLOOD PRESSURE: 78 MMHG | TEMPERATURE: 98.2 F | HEART RATE: 78 BPM | SYSTOLIC BLOOD PRESSURE: 130 MMHG | BODY MASS INDEX: 29.99 KG/M2 | RESPIRATION RATE: 17 BRPM | HEIGHT: 65 IN | OXYGEN SATURATION: 97 %

## 2023-11-28 DIAGNOSIS — R05.3 CHRONIC COUGH: ICD-10-CM

## 2023-11-28 DIAGNOSIS — J61 PNEUMOCONIOSIS DUE TO ASBESTOS AND OTHER MINERAL FIBERS: ICD-10-CM

## 2023-11-28 DIAGNOSIS — R41.89 OTHER SYMPTOMS AND SIGNS INVOLVING COGNITIVE FUNCTIONS AND AWARENESS: ICD-10-CM

## 2023-11-28 DIAGNOSIS — Z77.090 CONTACT WITH AND (SUSPECTED) EXPOSURE TO ASBESTOS: ICD-10-CM

## 2023-11-28 PROCEDURE — 94010 BREATHING CAPACITY TEST: CPT

## 2023-11-28 PROCEDURE — 99213 OFFICE O/P EST LOW 20 MIN: CPT | Mod: 25

## 2023-12-21 ENCOUNTER — RX RENEWAL (OUTPATIENT)
Age: 83
End: 2023-12-21

## 2024-01-09 RX ORDER — SODIUM SULFATE, POTASSIUM SULFATE AND MAGNESIUM SULFATE 1.6; 3.13; 17.5 G/177ML; G/177ML; G/177ML
17.5-3.13-1.6 SOLUTION ORAL
Qty: 1 | Refills: 0 | Status: ACTIVE | COMMUNITY
Start: 2024-01-09 | End: 1900-01-01

## 2024-01-09 RX ORDER — POLYETHYLENE GLYCOL 3350 17 G/17G
17 POWDER, FOR SOLUTION ORAL
Qty: 119 | Refills: 0 | Status: ACTIVE | COMMUNITY
Start: 2024-01-09 | End: 1900-01-01

## 2024-01-26 ENCOUNTER — OUTPATIENT (OUTPATIENT)
Dept: OUTPATIENT SERVICES | Facility: HOSPITAL | Age: 84
LOS: 1 days | End: 2024-01-26
Payer: MEDICARE

## 2024-01-26 VITALS
RESPIRATION RATE: 18 BRPM | TEMPERATURE: 97 F | SYSTOLIC BLOOD PRESSURE: 129 MMHG | WEIGHT: 171.96 LBS | HEIGHT: 65 IN | HEART RATE: 73 BPM | DIASTOLIC BLOOD PRESSURE: 72 MMHG | OXYGEN SATURATION: 98 %

## 2024-01-26 DIAGNOSIS — K63.5 POLYP OF COLON: ICD-10-CM

## 2024-01-26 DIAGNOSIS — Z98.890 OTHER SPECIFIED POSTPROCEDURAL STATES: Chronic | ICD-10-CM

## 2024-01-26 DIAGNOSIS — M10.9 GOUT, UNSPECIFIED: ICD-10-CM

## 2024-01-26 DIAGNOSIS — I48.91 UNSPECIFIED ATRIAL FIBRILLATION: ICD-10-CM

## 2024-01-26 DIAGNOSIS — I10 ESSENTIAL (PRIMARY) HYPERTENSION: ICD-10-CM

## 2024-01-26 DIAGNOSIS — Z90.49 ACQUIRED ABSENCE OF OTHER SPECIFIED PARTS OF DIGESTIVE TRACT: Chronic | ICD-10-CM

## 2024-01-26 LAB
ANION GAP SERPL CALC-SCNC: 10 MMOL/L — SIGNIFICANT CHANGE UP (ref 7–14)
BUN SERPL-MCNC: 11 MG/DL — SIGNIFICANT CHANGE UP (ref 7–23)
CALCIUM SERPL-MCNC: 9 MG/DL — SIGNIFICANT CHANGE UP (ref 8.4–10.5)
CHLORIDE SERPL-SCNC: 103 MMOL/L — SIGNIFICANT CHANGE UP (ref 98–107)
CO2 SERPL-SCNC: 26 MMOL/L — SIGNIFICANT CHANGE UP (ref 22–31)
CREAT SERPL-MCNC: 0.82 MG/DL — SIGNIFICANT CHANGE UP (ref 0.5–1.3)
EGFR: 87 ML/MIN/1.73M2 — SIGNIFICANT CHANGE UP
GLUCOSE SERPL-MCNC: 85 MG/DL — SIGNIFICANT CHANGE UP (ref 70–99)
HCT VFR BLD CALC: 39.8 % — SIGNIFICANT CHANGE UP (ref 39–50)
HGB BLD-MCNC: 13 G/DL — SIGNIFICANT CHANGE UP (ref 13–17)
MCHC RBC-ENTMCNC: 28.8 PG — SIGNIFICANT CHANGE UP (ref 27–34)
MCHC RBC-ENTMCNC: 32.7 GM/DL — SIGNIFICANT CHANGE UP (ref 32–36)
MCV RBC AUTO: 88.1 FL — SIGNIFICANT CHANGE UP (ref 80–100)
NRBC # BLD: 0 /100 WBCS — SIGNIFICANT CHANGE UP (ref 0–0)
NRBC # FLD: 0 K/UL — SIGNIFICANT CHANGE UP (ref 0–0)
PLATELET # BLD AUTO: 291 K/UL — SIGNIFICANT CHANGE UP (ref 150–400)
POTASSIUM SERPL-MCNC: 4.3 MMOL/L — SIGNIFICANT CHANGE UP (ref 3.5–5.3)
POTASSIUM SERPL-SCNC: 4.3 MMOL/L — SIGNIFICANT CHANGE UP (ref 3.5–5.3)
RBC # BLD: 4.52 M/UL — SIGNIFICANT CHANGE UP (ref 4.2–5.8)
RBC # FLD: 14.8 % — HIGH (ref 10.3–14.5)
SODIUM SERPL-SCNC: 139 MMOL/L — SIGNIFICANT CHANGE UP (ref 135–145)
WBC # BLD: 10.05 K/UL — SIGNIFICANT CHANGE UP (ref 3.8–10.5)
WBC # FLD AUTO: 10.05 K/UL — SIGNIFICANT CHANGE UP (ref 3.8–10.5)

## 2024-01-26 PROCEDURE — 93010 ELECTROCARDIOGRAM REPORT: CPT

## 2024-01-26 RX ORDER — SODIUM CHLORIDE 9 MG/ML
1000 INJECTION, SOLUTION INTRAVENOUS
Refills: 0 | Status: DISCONTINUED | OUTPATIENT
Start: 2024-02-02 | End: 2024-02-16

## 2024-01-26 NOTE — H&P PST ADULT - MUSCULOSKELETAL
details… uses cane for balance/ROM intact/no joint swelling/no joint erythema/no joint warmth/no calf tenderness/normal gait/strength 5/5 bilateral upper extremities/strength 5/5 bilateral lower extremities/abnormal gait

## 2024-01-26 NOTE — H&P PST ADULT - PROBLEM SELECTOR PLAN 1
Patient tentatively scheduled for Colonoscopy endomucosal resection on 2/2/24.  Pre-op instructions provided. Pt given verbal and written instructions with teach back on pepcid. Pt verbalized understanding.     CBC, BMP. EKG were done at [facility].  Copy of pre-op Eliquis instructions requested from Cardiologist. Patient tentatively scheduled for Colonoscopy endomucosal resection on 2/2/24.  Pre-op instructions provided. Pt given verbal and written instructions with teach back on pepcid. Pt verbalized understanding.     CBC, BMP. EKG were done at [facility].  Copy of pre-op Eliquis instructions requested from Cardiologist. Patient has an appt 1/29/24.

## 2024-01-26 NOTE — H&P PST ADULT - RESPIRATORY RATE (BREATHS/MIN)
Report has been given. All questions and concerns have been addressed at this time. Patient to be transported shortly on monitor.   
18

## 2024-01-26 NOTE — H&P PST ADULT - NEGATIVE NEUROLOGICAL SYMPTOMS
no weakness/no paresthesias/no generalized seizures/no focal seizures/no syncope/no tremors/no vertigo/no difficulty walking/no headache

## 2024-01-26 NOTE — H&P PST ADULT - NEGATIVE GENERAL GENITOURINARY SYMPTOMS
no hematuria/no flank pain L/no flank pain R/no urine discoloration/no incontinence/no dysuria/no urinary hesitancy

## 2024-01-26 NOTE — H&P PST ADULT - NEUROLOGICAL
details… normal/sensation intact/responds to pain/responds to verbal commands/cranial nerves intact/no spontaneous movement

## 2024-01-26 NOTE — H&P PST ADULT - HISTORY OF PRESENT ILLNESS
83 year old male with pmhx of HTN, HLD, Afib On Eliquis, Davis's esophagus, BPH, presents for pre-op evaluation for diagnosis of Polyp of colon. Patient is scheduled for Colonoscopy endomucosal resection. Patient had Colonoscopy 2022 and was found to have Hepatic flexure polyp. Denies hematochezia, melena, abdominal pain.   83 year old male with pmhx of HTN, HLD, Afib On Eliquis, Davis's esophagus, BPH, presents for pre-op evaluation for diagnosis of Polyp of colon. Patient is scheduled for Colonoscopy endomucosal resection. Patient had Colonoscopy 2022 and was found to have Hepatic flexure polyp which pathology shows Tubular adenoma. Denies hematochezia, melena, abdominal pain.

## 2024-01-26 NOTE — H&P PST ADULT - BP NONINVASIVE SYSTOLIC (MM HG)
Patient screened at the [facility]. Determined to fit Non-URI criteria. Vital signs taken. Abnormal vitals (if noted) brought to MD's attention. Explained to the patient that they need to be seen in our [facility] clinic. Patient was registered in [facility] for the [place] location. Verbalized understanding.       129

## 2024-01-26 NOTE — H&P PST ADULT - NSANTHTOTALSCORECAL_ENT_A_CORE
FAMILY HISTORY:  Father  Still living? Yes, Estimated age: Age Unknown  Family history of stroke, Age at diagnosis: Age Unknown    Mother  Still living? Yes, Estimated age: Age Unknown  Family history of colon cancer, Age at diagnosis: Age Unknown    
2

## 2024-01-26 NOTE — H&P PST ADULT - NSICDXPASTMEDICALHX_GEN_ALL_CORE_FT
70
PAST MEDICAL HISTORY:  Afib     Barretts esophagus     Glaucoma     Gout     H/O asbestosis     History of BPH     History of short term memory loss follows up with neurologist    Hypercholesteremia     Hypertension     Polyp of colon

## 2024-02-01 NOTE — ASU PATIENT PROFILE, ADULT - VISION (WITH CORRECTIVE LENSES IF THE PATIENT USUALLY WEARS THEM):
Pt arrives with  with c/o SOB chest pain and cough onset 3days. Pt reports increased SOB with supine position and exertion.
Normal vision: sees adequately in most situations; can see medication labels, newsprint

## 2024-02-01 NOTE — ASU PATIENT PROFILE, ADULT - NSICDXPASTMEDICALHX_GEN_ALL_CORE_FT
PAST MEDICAL HISTORY:  Afib     Barretts esophagus     Glaucoma     Gout     H/O asbestosis     History of BPH     History of short term memory loss follows up with neurologist    Hypercholesteremia     Hypertension     Polyp of colon

## 2024-02-01 NOTE — ASU PATIENT PROFILE, ADULT - ARRIVAL TIME
10:00 Detail Level: Simple Price (Do Not Change): 0.00 Instructions: This plan will send the code FBSD to the PM system.  DO NOT or CHANGE the price.

## 2024-02-02 ENCOUNTER — TRANSCRIPTION ENCOUNTER (OUTPATIENT)
Age: 84
End: 2024-02-02

## 2024-02-02 ENCOUNTER — RESULT REVIEW (OUTPATIENT)
Age: 84
End: 2024-02-02

## 2024-02-02 ENCOUNTER — OUTPATIENT (OUTPATIENT)
Dept: OUTPATIENT SERVICES | Facility: HOSPITAL | Age: 84
LOS: 1 days | Discharge: HOME | End: 2024-02-02
Payer: MEDICARE

## 2024-02-02 ENCOUNTER — APPOINTMENT (OUTPATIENT)
Dept: GASTROENTEROLOGY | Facility: HOSPITAL | Age: 84
End: 2024-02-02

## 2024-02-02 VITALS
DIASTOLIC BLOOD PRESSURE: 87 MMHG | HEIGHT: 65 IN | RESPIRATION RATE: 16 BRPM | OXYGEN SATURATION: 100 % | SYSTOLIC BLOOD PRESSURE: 101 MMHG | WEIGHT: 175.05 LBS | HEART RATE: 79 BPM | TEMPERATURE: 97 F

## 2024-02-02 VITALS
RESPIRATION RATE: 18 BRPM | HEART RATE: 61 BPM | SYSTOLIC BLOOD PRESSURE: 133 MMHG | DIASTOLIC BLOOD PRESSURE: 74 MMHG | OXYGEN SATURATION: 100 %

## 2024-02-02 DIAGNOSIS — Z98.890 OTHER SPECIFIED POSTPROCEDURAL STATES: Chronic | ICD-10-CM

## 2024-02-02 DIAGNOSIS — K63.5 POLYP OF COLON: ICD-10-CM

## 2024-02-02 DIAGNOSIS — Z90.49 ACQUIRED ABSENCE OF OTHER SPECIFIED PARTS OF DIGESTIVE TRACT: Chronic | ICD-10-CM

## 2024-02-02 PROCEDURE — 88307 TISSUE EXAM BY PATHOLOGIST: CPT | Mod: 26

## 2024-02-02 PROCEDURE — 45390 COLONOSCOPY W/RESECTION: CPT | Mod: XU,GC

## 2024-02-02 PROCEDURE — 45385 COLONOSCOPY W/LESION REMOVAL: CPT | Mod: GC

## 2024-02-02 DEVICE — GEL PURASTAT 3ML: Type: IMPLANTABLE DEVICE | Status: FUNCTIONAL

## 2024-02-02 RX ADMIN — SODIUM CHLORIDE 30 MILLILITER(S): 9 INJECTION, SOLUTION INTRAVENOUS at 10:50

## 2024-02-02 NOTE — PRE PROCEDURE NOTE - PRE PROCEDURE EVALUATION
Attending Physician: Surya                           Procedure: colonoscopy     Indication for Procedure: large polyp removal  ________________________________________________________  PAST MEDICAL & SURGICAL HISTORY:  Gout      Afib      Barretts esophagus      H/O asbestosis      Hypertension      Hypercholesteremia      History of short term memory loss  follows up with neurologist      Glaucoma      Polyp of colon      History of BPH      H/O hernia repair      S/P appendectomy  12/26/2020      S/P laser trabeculoplasty of eye        ALLERGIES:  strawberry (Stomach Upset)  penicillins (Unknown)    HOME MEDICATIONS:  allopurinol 100 mg oral tablet: 0.5 tab(s) orally once a day  amLODIPine 5 mg oral tablet: 1 tab(s) orally once a day  atorvastatin 20 mg oral tablet: 1 tab(s) orally once a day (at bedtime)  Coq10: 1 tab PO daily- Last dose 1/25/24  Eliquis 5 mg oral tablet: 1 tab(s) orally 2 times a day  Folic acid: 1 tab PO daily  Krill Oil: 1 cap PO daily- Last dose 1/25/24  metoprolol succinate 50 mg oral capsule, extended release: 1.5 cap(s) orally once a day  Multiple Vitamins oral tablet: 1 tab(s) orally once a day  omeprazole 40 mg oral delayed release capsule: 1 cap(s) orally once a day  Super B complex: 1 tab PO daily- Last dose 1/25/24  Tumeric: 1 tablet PO daily-last dose 1/25/24  Vitamin D: 50, 000 iu PO daily - Wednesdays    AICD/PPM: [ ] yes   [x] no    PERTINENT LAB DATA:                      PHYSICAL EXAMINATION:    Height (cm): 165.1  Weight (kg): 79.4  BMI (kg/m2): 29.1  BSA (m2): 1.87T(C): --  HR: --  BP: --  RR: --  SpO2: --    Constitutional: NAD    Respiratory: CTAB/L  Cardiovascular: S1 and S2  Gastrointestinal: BS+, soft, NT/ND  Extremities: No peripheral edema  Neurological: A/O x 1-2, no focal deficits        COMMENTS:    The patient is a suitable candidate for the planned procedure unless box checked [ ]  No, explain:

## 2024-02-02 NOTE — ASU DISCHARGE PLAN (ADULT/PEDIATRIC) - NS MD DC FALL RISK RISK
For information on Fall & Injury Prevention, visit: [email or web address] OR  [email or web address] OR  https://www.cdc.gov/steadi/patient.html

## 2024-02-02 NOTE — ASU DISCHARGE PLAN (ADULT/PEDIATRIC) - NSDISCH_IV_ENDO_ALL_CORE_FT
"Note to Dr. Harris/faciliator to review/send new Rx for 2% brimonidine if applicable and review if alternative to lumigan appropriate per [facility] request (non-formulary)    Hard copy faxed would be needed signed by MD Chandler Worthington RN 1:59 PM 11/01/18    Nurse Emma the [facility] is calling about the RX's brimonidine (ALPHAGAN P) 0.1 % ophthalmic solution & atoprost (LUMIGAN) 0.01 % that were sent. They are not formulary at the [facility] so they are wondering if there is \"proof of failure of the first line of treatment\" or why they are ordering those specific ones?  Please call Emma back at [phone].     "
"[facility] Call Center    Phone Message    May a detailed message be left on voicemail: yes    Reason for Call: Nurse Emma the [facility] is calling about the RX's brimonidine (ALPHAGAN P) 0.1 % ophthalmic solution & atoprost (LUMIGAN) 0.01 % that were sent. They are not formulary at the [facility] so they are wondering if there is \"proof of failure of the first line of treatment\" or why they are ordering those specific ones?  Please call Emma back at [phone] ext [phone].    Action Taken: Patient transferred to: EYE    "
If your Intravenous (IV) site becomes red, swollen or painful, call your doctor.

## 2024-02-05 RX ORDER — APIXABAN 2.5 MG/1
1 TABLET, FILM COATED ORAL
Qty: 0 | Refills: 0 | DISCHARGE
Start: 2024-02-05

## 2024-02-09 ENCOUNTER — INPATIENT (INPATIENT)
Facility: HOSPITAL | Age: 84
LOS: 2 days | Discharge: HOME HEALTH | End: 2024-02-12
Attending: HOSPITALIST | Admitting: HOSPITALIST
Payer: MEDICARE

## 2024-02-09 VITALS
HEIGHT: 65 IN | RESPIRATION RATE: 18 BRPM | DIASTOLIC BLOOD PRESSURE: 60 MMHG | TEMPERATURE: 97 F | HEART RATE: 70 BPM | OXYGEN SATURATION: 98 % | SYSTOLIC BLOOD PRESSURE: 100 MMHG

## 2024-02-09 DIAGNOSIS — Z90.49 ACQUIRED ABSENCE OF OTHER SPECIFIED PARTS OF DIGESTIVE TRACT: Chronic | ICD-10-CM

## 2024-02-09 DIAGNOSIS — Z98.890 OTHER SPECIFIED POSTPROCEDURAL STATES: Chronic | ICD-10-CM

## 2024-02-09 DIAGNOSIS — K92.2 GASTROINTESTINAL HEMORRHAGE, UNSPECIFIED: ICD-10-CM

## 2024-02-09 PROBLEM — K63.5 POLYP OF COLON: Chronic | Status: ACTIVE | Noted: 2024-01-26

## 2024-02-09 PROBLEM — Z87.438 PERSONAL HISTORY OF OTHER DISEASES OF MALE GENITAL ORGANS: Chronic | Status: ACTIVE | Noted: 2024-01-26

## 2024-02-09 LAB
ALBUMIN SERPL ELPH-MCNC: 3.7 G/DL — SIGNIFICANT CHANGE UP (ref 3.3–5)
ALP SERPL-CCNC: 63 U/L — SIGNIFICANT CHANGE UP (ref 40–120)
ALT FLD-CCNC: 18 U/L — SIGNIFICANT CHANGE UP (ref 4–41)
ANION GAP SERPL CALC-SCNC: 9 MMOL/L — SIGNIFICANT CHANGE UP (ref 7–14)
APTT BLD: 29.1 SEC — SIGNIFICANT CHANGE UP (ref 24.5–35.6)
AST SERPL-CCNC: 24 U/L — SIGNIFICANT CHANGE UP (ref 4–40)
BASOPHILS # BLD AUTO: 0.12 K/UL — SIGNIFICANT CHANGE UP (ref 0–0.2)
BASOPHILS NFR BLD AUTO: 0.7 % — SIGNIFICANT CHANGE UP (ref 0–2)
BILIRUB SERPL-MCNC: 0.3 MG/DL — SIGNIFICANT CHANGE UP (ref 0.2–1.2)
BLD GP AB SCN SERPL QL: NEGATIVE — SIGNIFICANT CHANGE UP
BUN SERPL-MCNC: 13 MG/DL — SIGNIFICANT CHANGE UP (ref 7–23)
CALCIUM SERPL-MCNC: 8.5 MG/DL — SIGNIFICANT CHANGE UP (ref 8.4–10.5)
CHLORIDE SERPL-SCNC: 107 MMOL/L — SIGNIFICANT CHANGE UP (ref 98–107)
CO2 SERPL-SCNC: 24 MMOL/L — SIGNIFICANT CHANGE UP (ref 22–31)
CREAT SERPL-MCNC: 0.91 MG/DL — SIGNIFICANT CHANGE UP (ref 0.5–1.3)
EGFR: 84 ML/MIN/1.73M2 — SIGNIFICANT CHANGE UP
EOSINOPHIL # BLD AUTO: 0.32 K/UL — SIGNIFICANT CHANGE UP (ref 0–0.5)
EOSINOPHIL NFR BLD AUTO: 2 % — SIGNIFICANT CHANGE UP (ref 0–6)
GLUCOSE SERPL-MCNC: 114 MG/DL — HIGH (ref 70–99)
HCT VFR BLD CALC: 34.1 % — LOW (ref 39–50)
HGB BLD-MCNC: 11.1 G/DL — LOW (ref 13–17)
IANC: 12.7 K/UL — HIGH (ref 1.8–7.4)
IMM GRANULOCYTES NFR BLD AUTO: 0.8 % — SIGNIFICANT CHANGE UP (ref 0–0.9)
INR BLD: 1.37 RATIO — HIGH (ref 0.85–1.18)
LIDOCAIN IGE QN: 31 U/L — SIGNIFICANT CHANGE UP (ref 7–60)
LYMPHOCYTES # BLD AUTO: 1.97 K/UL — SIGNIFICANT CHANGE UP (ref 1–3.3)
LYMPHOCYTES # BLD AUTO: 12.2 % — LOW (ref 13–44)
MCHC RBC-ENTMCNC: 28.5 PG — SIGNIFICANT CHANGE UP (ref 27–34)
MCHC RBC-ENTMCNC: 32.6 GM/DL — SIGNIFICANT CHANGE UP (ref 32–36)
MCV RBC AUTO: 87.7 FL — SIGNIFICANT CHANGE UP (ref 80–100)
MONOCYTES # BLD AUTO: 0.91 K/UL — HIGH (ref 0–0.9)
MONOCYTES NFR BLD AUTO: 5.6 % — SIGNIFICANT CHANGE UP (ref 2–14)
NEUTROPHILS # BLD AUTO: 12.7 K/UL — HIGH (ref 1.8–7.4)
NEUTROPHILS NFR BLD AUTO: 78.7 % — HIGH (ref 43–77)
NRBC # BLD: 0 /100 WBCS — SIGNIFICANT CHANGE UP (ref 0–0)
NRBC # FLD: 0 K/UL — SIGNIFICANT CHANGE UP (ref 0–0)
PLATELET # BLD AUTO: 277 K/UL — SIGNIFICANT CHANGE UP (ref 150–400)
POTASSIUM SERPL-MCNC: 4.3 MMOL/L — SIGNIFICANT CHANGE UP (ref 3.5–5.3)
POTASSIUM SERPL-SCNC: 4.3 MMOL/L — SIGNIFICANT CHANGE UP (ref 3.5–5.3)
PROT SERPL-MCNC: 6.2 G/DL — SIGNIFICANT CHANGE UP (ref 6–8.3)
PROTHROM AB SERPL-ACNC: 15.3 SEC — HIGH (ref 9.5–13)
RBC # BLD: 3.89 M/UL — LOW (ref 4.2–5.8)
RBC # FLD: 15 % — HIGH (ref 10.3–14.5)
RH IG SCN BLD-IMP: POSITIVE — SIGNIFICANT CHANGE UP
RH IG SCN BLD-IMP: POSITIVE — SIGNIFICANT CHANGE UP
SODIUM SERPL-SCNC: 140 MMOL/L — SIGNIFICANT CHANGE UP (ref 135–145)
WBC # BLD: 16.15 K/UL — HIGH (ref 3.8–10.5)
WBC # FLD AUTO: 16.15 K/UL — HIGH (ref 3.8–10.5)

## 2024-02-09 PROCEDURE — 99291 CRITICAL CARE FIRST HOUR: CPT

## 2024-02-09 PROCEDURE — 99291 CRITICAL CARE FIRST HOUR: CPT | Mod: GC

## 2024-02-09 PROCEDURE — 74174 CTA ABD&PLVS W/CONTRAST: CPT | Mod: 26,MA

## 2024-02-09 RX ORDER — SOD SULF/SODIUM/NAHCO3/KCL/PEG
4000 SOLUTION, RECONSTITUTED, ORAL ORAL ONCE
Refills: 0 | Status: COMPLETED | OUTPATIENT
Start: 2024-02-09 | End: 2024-02-09

## 2024-02-09 RX ORDER — QUETIAPINE FUMARATE 200 MG/1
12.5 TABLET, FILM COATED ORAL ONCE
Refills: 0 | Status: COMPLETED | OUTPATIENT
Start: 2024-02-09 | End: 2024-02-09

## 2024-02-09 RX ORDER — PANTOPRAZOLE SODIUM 20 MG/1
40 TABLET, DELAYED RELEASE ORAL ONCE
Refills: 0 | Status: COMPLETED | OUTPATIENT
Start: 2024-02-09 | End: 2024-02-09

## 2024-02-09 RX ORDER — AMLODIPINE BESYLATE 2.5 MG/1
1 TABLET ORAL
Refills: 0 | DISCHARGE

## 2024-02-09 RX ORDER — ATORVASTATIN CALCIUM 80 MG/1
1 TABLET, FILM COATED ORAL
Refills: 0 | DISCHARGE

## 2024-02-09 RX ORDER — ALLOPURINOL 300 MG
0.5 TABLET ORAL
Refills: 0 | DISCHARGE

## 2024-02-09 RX ORDER — ERGOCALCIFEROL 1.25 MG/1
0 CAPSULE ORAL
Refills: 0 | DISCHARGE

## 2024-02-09 RX ORDER — OMEPRAZOLE 10 MG/1
1 CAPSULE, DELAYED RELEASE ORAL
Refills: 0 | DISCHARGE

## 2024-02-09 RX ORDER — FOLIC ACID 0.8 MG
0 TABLET ORAL
Refills: 0 | DISCHARGE

## 2024-02-09 RX ORDER — PROTHROMBIN COMPLEX CONCENTRATE (HUMAN) 25.5; 16.5; 24; 22; 22; 26 [IU]/ML; [IU]/ML; [IU]/ML; [IU]/ML; [IU]/ML; [IU]/ML
4000 POWDER, FOR SOLUTION INTRAVENOUS ONCE
Refills: 0 | Status: COMPLETED | OUTPATIENT
Start: 2024-02-09 | End: 2024-02-09

## 2024-02-09 RX ORDER — UBIDECARENONE 100 MG
0 CAPSULE ORAL
Refills: 0 | DISCHARGE

## 2024-02-09 RX ADMIN — PROTHROMBIN COMPLEX CONCENTRATE (HUMAN) 400 INTERNATIONAL UNIT(S): 25.5; 16.5; 24; 22; 22; 26 POWDER, FOR SOLUTION INTRAVENOUS at 20:01

## 2024-02-09 RX ADMIN — PANTOPRAZOLE SODIUM 40 MILLIGRAM(S): 20 TABLET, DELAYED RELEASE ORAL at 19:55

## 2024-02-09 RX ADMIN — Medication 4000 MILLILITER(S): at 23:05

## 2024-02-09 NOTE — ED PROVIDER NOTE - OBJECTIVE STATEMENT
83-year-old male, history of A-fib on Eliquis, hypertension, hyperlipidemia, Davis's esophagus, BPH, presents with rectal bleeding for 1 day.  Reports that 7 days ago he underwent colonoscopy with Dr. London at [facility].  Resumed his home Eliquis 3 days ago, last dose 8:30am today.  Today began having bloody diarrhea, total of 4 bouts so far, whole bowl bright red blood.  3 hours ago felt unwell and syncopized for a couple of seconds, wife witnessed and reports he is back to baseline.  At present moment denies any chest pain, dizziness, shortness of breath, abdominal pain, nausea, vomiting.

## 2024-02-09 NOTE — ED ADULT NURSE NOTE - OBJECTIVE STATEMENT
Pt awake and alert x 2 reoriented as needed. pt with rectal bleeding bright red blood with small clots noted. pt denies sob or chest pain , NSR on cardiac monitor . Pt arrives with iv to left a/c placed by EMS second iv 20g placed to right a/c. Pt awaiting further eval.

## 2024-02-09 NOTE — PATIENT PROFILE ADULT - NSPROMEDSBROUGHTTOHOSP_GEN_A_NUR
"Subjective:       Patient ID: Harvey Eduardo is a 68 y.o. female.    Vitals:  height is 5' 8" (1.727 m) and weight is 93.9 kg (207 lb). Her temperature is 98.9 °F (37.2 °C). Her blood pressure is 127/56 (abnormal) and her pulse is 71. Her respiration is 15 and oxygen saturation is 100%.     Chief Complaint: Sinus Problem    Patient present in office to for allergy problem. Reports sinus congestion over the past few weeks and eye itching/irritation x 2 days. Patient states she started using her  eye drops (durezol) with little relief. Reports sinus symptoms have improved slightly but she is having some sinus pressure. Denies any eye pain, purulent d/c, fever/chills.     Sinus Problem  This is a new problem. The current episode started in the past 7 days. The problem has been gradually worsening since onset. There has been no fever. Her pain is at a severity of 5/10. The pain is moderate. Associated symptoms include congestion, coughing, ear pain, headaches, a hoarse voice, sinus pressure and a sore throat. Pertinent negatives include no chills, diaphoresis, neck pain, shortness of breath, sneezing or swollen glands. Treatments tried: Nasal Congestion OTC medication. The treatment provided significant relief.       Constitution: Negative for chills, sweating and fever.   HENT: Positive for ear pain, congestion, sinus pressure and sore throat.    Neck: Negative for neck pain.   Eyes: Positive for eye itching and eye redness. Negative for eye trauma, eye pain, vision loss and eyelid swelling.   Respiratory: Positive for cough and sputum production. Negative for shortness of breath and wheezing.    Gastrointestinal: Negative.    Allergic/Immunologic: Negative for sneezing.   Neurological: Positive for headaches.       Objective:      Physical Exam   Constitutional: She appears well-developed.  Non-toxic appearance. She does not appear ill. No distress.   HENT:   Head: Normocephalic and atraumatic. Head is " no without right periorbital erythema and without left periorbital erythema.   Ears:   Right Ear: Tympanic membrane and external ear normal.   Left Ear: Tympanic membrane and external ear normal.   Nose: Congestion present. Right sinus exhibits frontal sinus tenderness. Left sinus exhibits frontal sinus tenderness.   Mouth/Throat: Mucous membranes are moist. Oropharynx is clear.   Eyes: Conjunctivae, EOM and lids are normal. Right eye exhibits no discharge. Left eye exhibits no discharge. Right conjunctiva is not injected. Left conjunctiva is not injected.   Neck: Neck supple.   Pulmonary/Chest: Effort normal and breath sounds normal.   Abdominal: Normal appearance.   Musculoskeletal: Normal range of motion.         General: Normal range of motion.   Neurological: no focal deficit. She is alert. She displays no weakness. Gait normal.   Skin: Skin is warm, dry, not diaphoretic, not pale and no rash.   Psychiatric: Her behavior is normal.         Assessment:       1. Bacterial sinusitis    2. Allergic conjunctivitis of both eyes          Plan:         Bacterial sinusitis  -     amoxicillin-clavulanate 875-125mg (AUGMENTIN) 875-125 mg per tablet; Take 1 tablet by mouth 2 (two) times daily.  Dispense: 20 tablet; Refill: 0    Allergic conjunctivitis of both eyes  -     azelastine (OPTIVAR) 0.05 % ophthalmic solution; Place 1 drop into both eyes 2 (two) times daily.  Dispense: 6 mL; Refill: 0

## 2024-02-09 NOTE — CONSULT NOTE ADULT - SUBJECTIVE AND OBJECTIVE BOX
Interventional Radiology    HPI: 84 y/o M with A-fib on Eliquis (last dose 8:30AM today), hypertension, hyperlipidemia, Davis's esophagus, BPH, presents with rectal bleeding for 1 day. Patient underwent colonoscopy with EMR of 5cm polyp in ascending colon with Dr. London at [facility] on 2/2. Resection and retrieval were complete. Ablation of visible vessel and margin with soft coag using hot biopsy forcep. Purastat (6 mL) was applied to prevent delayed bleeding. The patient did well afterward went home after procedure. He resumed his home Eliquis 3 days ago.  Today the patient began having bloody diarrhea with a syncopal episode.     Allergies: penicillins (Unknown)    Medications (Abx/Cardiac/Anticoagulation/Blood Products)  prothrombin complex concentrate IVPB (KCENTRA): 400 mL/Hr IV Intermittent (02-09 @ 20:01)    Data:  165.1  79.4  T(C): 36.2  HR: 85  BP: 132/87  RR: 18  SpO2: 100%    LABS:                          11.1   16.15 )-----------( 277      ( 09 Feb 2024 17:54 )             34.1     02-09    140  |  107  |  13  ----------------------------<  114<H>  4.3   |  24  |  0.91    Ca    8.5      09 Feb 2024 17:54    TPro  6.2  /  Alb  3.7  /  TBili  0.3  /  DBili  x   /  AST  24  /  ALT  18  /  AlkPhos  63  02-09    PT/INR - ( 09 Feb 2024 17:54 )   PT: 15.3 sec;   INR: 1.37 ratio         PTT - ( 09 Feb 2024 17:54 )  PTT:29.1 sec    Radiology: CTA reviewed    Assessment/Plan: 84 yo M with active bleed at hepatic flexure at site of polyp resection    -- Recommend conservative management at this time   --Transfuse PRN  --Hold home eliquis  --Trend CBC  --GI follow up for colonoscopy  --IR available if patient decompensates    Luisito Monteiro MD   Interventional Radiology PGY 5  Available on [employer] TEAMS    For EMERGENT inquiries/questions:  IR Pager ([facility]): [phone]  IR Pager ([facility]): [phone] ; [id number]    For non-emergent consults/questions:   Please place a sunrise order "Consult- Interventional Radiology" with an appropriate callback number    For questions about scheduling during appropriate work hours, call IR :  [facility]: [phone]  [facility]: [phone]    For outpatient IR booking:  [facility]: [phone]  [facility]: [phone]

## 2024-02-09 NOTE — ED PROVIDER NOTE - ATTENDING CONTRIBUTION TO CARE
I have personally performed a face to face medical and diagnostic evaluation of the patient. I have discussed with and reviewed the Resident's note and agree with the History, ROS, Physical Exam and MDM unless otherwise indicated. A brief summary of my personal evaluation and impression can be found below.    Lorena GAMBOA: 83M hx of afib on AC HTN HLD BPH presents with a cc of BRBPR x1 day, repeated episodes about x3, recently had colonoscopy had polyp removed, bleeding persisitng promptig visit, no fever, Denies n/v/f/c/cp/sob. Denies headache, syncope, lightheadedness, dizziness. Denies chest palpitations, abdominal pain. Denies edema. Denies dysuria, hematuria, had a breif episode earlier today of passing out, no head strike.     All other ROS negative, except as above and as per HPI and ROS section.    VITALS: Initial triage and subsequent vitals have been reviewed by me.  GEN APPEARANCE: Alert, non-toxic, well-appearing, NAD.  HEAD: Atraumatic.  EYES: PERRLa, EOMI, vision grossly intact.   NECK: Supple  CV: RRR, S1S2, no c/r/m/g. Cap refill <2 seconds. No bruits.   LUNGS: CTAB. No abnormal breath sounds.  ABDOMEN: Soft, NTND. No guarding or rebound. +BRBPR   MSK/EXT: No spinal or extremity point tenderness. No CVA ttp. Pelvis stable. No peripheral edema.  NEURO: Alert, follows commands. Weight bearing normal. Speech normal. Sensation and motor normal x4 extremities.   SKIN: Warm, dry and intact. No rash.  PSYCH: Appropriate    Plan/MDM: exam vss non toxic PE as above ddx c/f brisk LGIB given bloody stools, currenty HDS, plan to check labs, t/s, cta abdomen, will consider transfusion/reversal given on AC, consider icu/gi/ir consult as indicated, additional meds as needed, pt will require admission.

## 2024-02-09 NOTE — H&P ADULT - NSHPSOCIALHISTORY_GEN_ALL_CORE
The patient lives with his wife at home. He is capable of maintaining his ADLs independently and ambulates with Lives with wife  Can climb 1-2 flights of stairs without assistance  METS 4

## 2024-02-09 NOTE — ED ADULT NURSE REASSESSMENT NOTE - NS ED NURSE REASSESS COMMENT FT1
as per MICU MD Armas, RN to hold GOLYTELY until 11pm. pt A&Ox3 with periods of confusion/ repetitive language. pt has had 3, small to medium sized dark red bloody bowel movements since 840pm. 1 unit PRBCS running at bedside. pt denies dizziness, weakness, pain, c/p, SOB, fevers, n/v at this time. no acute distress noted. wife at bedside. respirations even and unlabored. remains no continuos monitor. awaiting transport to MICU. safety maintained, call bell within reach.

## 2024-02-09 NOTE — ED ADULT NURSE REASSESSMENT NOTE - NS ED NURSE REASSESS COMMENT FT1
Blood transfusion given, vs wnl. Pts family abd pt educated on adverse reactions instructed to report them.

## 2024-02-09 NOTE — ED PROVIDER NOTE - PHYSICAL EXAMINATION
Physical Exam:  Gen: NAD, AOx3, non-toxic appearing  Head: NCAT  HEENT: EOMI, PEERLA, normal conjunctiva, tongue midline, oral mucosa moist  Lung: CTAB, no respiratory distress, no wheezes/rhonchi/rales B/L, speaking in full sentences  CV: RRR, no murmurs, rubs or gallops  Abd: soft, NT, ND, no guarding, no rigidity, no rebound tenderness, no CVA tenderness   MSK: no visible deformities, ROM normal in UE/LE, no back pain  Neuro: No focal sensory or motor deficits  Skin: Warm, well perfused, no rash, no leg swelling  Psych: normal affect, calm Physical Exam:  Gen: NAD, non-toxic appearing  Head: NCAT  HEENT: EOMI, PEERLA, normal conjunctiva, tongue midline, oral mucosa moist  Lung: CTAB, no respiratory distress, no wheezes/rhonchi/rales B/L, speaking in full sentences  CV: RRR, no murmurs, rubs or gallops  Abd: soft, NT, ND, no guarding, no rigidity, no rebound tenderness  Rectal (chaperoned by Dr. Carrillo): gross red blood on digital rectal, no observable external hemorrhoid  MSK: no visible deformities, ROM normal in UE/LE, no back pain  Neuro: No focal sensory or motor deficits  Skin: Warm, well perfused, no rash, no leg swelling  Psych: normal affect, calm

## 2024-02-09 NOTE — CHART NOTE - NSCHARTNOTEFT_GEN_A_CORE
Called by ER for active GI bleed at hepatic flexure.    Briefly, this is an 82 y/o M with A-fib on Eliquis (last dose 8:30AM today), hypertension, hyperlipidemia, Davis's esophagus, BPH, presents with rectal bleeding for 1 day. Patient underwent colonoscopy with EMR of 5cm polyp in ascending colon with Dr. London at [facility] on 2/2. Resection and retrieval were complete. Ablation of visible vessel and margin with soft coag using hot biopsy forcep. Purastat (6 mL) was applied to prevent delayed bleeding. The patient did well afterward went home after procedure. He resumed his home Eliquis 3 days ago.  Today the patient began having bloody diarrhea with a syncopal episode.     In the ED, the patient was HDS. Hbg 11.1 (baseline 13). CTA abd/pelvis positive for arterial bleed in hepatic flexure, likely at site of polyp resection. IR called for embolization but they are deferring to GI.     Recommendations:  - Please get patient to ICU for active, arterial lower GI bleed  - Resuscitate to keep hbg >7 and plts >50   - ED to order Kcentra for reversal of AC  - Please hold any further AC  - Will order Golytely to be given STAT  - Please have patient drink entire prep tonight and NPO at midnight   - Will plan to perform colonoscopy in ICU tomorrow AM or sooner if patient begins to decompensate  - Please maintain active type and screen     Case discussed with Dr. Joanna Pritchard MD  Gastroenterology/Hepatology Fellow, PGY-4  Please contact via TEAMS    NON-URGENT CONSULTS:  Please email [email or web address]  [email or web address]

## 2024-02-09 NOTE — H&P ADULT - HISTORY OF PRESENT ILLNESS
The patient is an 84yo man history of A-fib on Eliquis, hypertension, hyperlipidemia, Davis's esophagus, BPH who presented wtih 1 day of rectal bleeding. The patient had a prior colonoscopy on 2/2/24 with Dr. London at [facility]. A polyp was identified in the ascending colon and was resected with ablation and Purastat applied. The patient tolerated the procedure well and returned home the same day. On 2/6, the patient resumed his eliquis; on 2/9 the patient began to experience repeated episodes of bloody diarrhea with a syncopal episode. EMS services were engaged and the patient was brought to the hospital.    ED Course:  T: Afebrile, HR: 70, BP: 150/100 (lying down), 90/60 (sitting), SpO2 98% RA    Patient denied CP, dizzienss, SOB, abdominal pain, nausea, or vomiting. Last dose of eliquis was the morning of 2/9 at 8:30AM. Gross red blood was present on digital rectal exam without observable external hemorrhoids. Hgb/Hct 11.1/34.1; Plt 277; PT 15.3; INR 1.37, aPTT 29.1; CMP wnl.    CTA of the Abodmen/Pelvis demonstrated active arterial colonic hemorrhage localized the hepatic flexure which correlates to his previous polyp removal on 2/2. IR was consulted and deferred embolization for GI management. GI was consulted and recommended admission to MICU for close hemodynamic monitoring and Golytely prep with plans to scope the patient in the MICU. Patient received 1u PRBC in the ED and given K Centra then transferred to MICU. The patient is an 82yo man history of A-fib on Eliquis, hypertension, hyperlipidemia, Davis's esophagus, BPH, and dementia who presented wtih 1 day of rectal bleeding. The patient had a prior colonoscopy on 2/2/24 with Dr. London at [facility]. A polyp was identified in the ascending colon and was resected with ablation and Purastat applied. The patient tolerated the procedure well and returned home the same day. On 2/6, the patient resumed his eliquis; on 2/9 the patient began to experience repeated episodes of bloody diarrhea with a syncopal episode. EMS services were engaged and the patient was brought to the hospital.    ED Course:  T: Afebrile, HR: 70, BP: 150/100 (lying down), 90/60 (sitting), SpO2 98% RA    Patient denied CP, dizzienss, SOB, abdominal pain, nausea, or vomiting. Last dose of eliquis was the morning of 2/9 at 8:30AM. Gross red blood was present on digital rectal exam without observable external hemorrhoids. Hgb/Hct 11.1/34.1; Plt 277; PT 15.3; INR 1.37, aPTT 29.1; CMP wnl.    CTA of the Abodmen/Pelvis demonstrated active arterial colonic hemorrhage localized the hepatic flexure which correlates to his previous polyp removal on 2/2. IR was consulted and deferred embolization for GI management. GI was consulted and recommended admission to MICU for close hemodynamic monitoring and Golytely prep with plans to scope the patient in the MICU. Patient received 1u PRBC in the ED and given K Centra then transferred to MICU.

## 2024-02-09 NOTE — H&P ADULT - PATIENT'S GENDER IDENTITY
Please refill  losartan (COZAAR) 50 MG tablet  90 day supply     Please send to [facility] in [place]    Male

## 2024-02-09 NOTE — ED ADULT NURSE REASSESSMENT NOTE - NS ED NURSE REASSESS COMMENT FT1
Report received from GARFIELD Cabello. Report received from GARFIELD Cabello. Pt A&Ox3 sitting up in stretcher at this time. Respirations even and unlabored on room air. Pt remains on continuous cardiac monitor, NSR noted. Pt denies any headache dizziness, chest pain and SOB. No acute distress noted. Blood remains running at this time. Plan of care ongoing, comfort measures provided and safety measures maintained. Awaiting bed assignment.

## 2024-02-09 NOTE — ED ADULT NURSE NOTE - NS ED NURSE TRANSPORT WITH
[facility] GARFIELD KEE AND EDT BRYON/Cardiac Monitor/Defib/ACLS/Rescue Kit/O2/BVM/IV pump/Blood Products

## 2024-02-09 NOTE — ED ADULT NURSE REASSESSMENT NOTE - NS ED NURSE REASSESS COMMENT FT1
Pt had second bloody BM attending Dr pradhan aware states pt okay to go over to ct scan. pts vs wnl prior to going to cr scan. PRBC requested from blood bank awaiting for blood.

## 2024-02-09 NOTE — ED PROVIDER NOTE - CLINICAL SUMMARY MEDICAL DECISION MAKING FREE TEXT BOX
83-year-old male, history of A-fib on Eliquis, hypertension, hyperlipidemia, Davis's esophagus, BPH, presents with rectal bleeding for 1 day. Vitals unremarkable. Physical exam significant for abdomen soft, NT, ND, no guarding, no rigidity, no rebound tenderness, gross red blood on digital rectal, no observable external hemorrhoid. Concern for lower GI bleed, will obtain labs, CTA GI bleed protocol, email GI, reassess

## 2024-02-09 NOTE — H&P ADULT - NSHPLABSRESULTS_GEN_ALL_CORE
LABS:                         11.1   16.15 )-----------( 277      ( 09 Feb 2024 17:54 )             34.1     02-09    140  |  107  |  13  ----------------------------<  114<H>  4.3   |  24  |  0.91    Ca    8.5      09 Feb 2024 17:54    TPro  6.2  /  Alb  3.7  /  TBili  0.3  /  DBili  x   /  AST  24  /  ALT  18  /  AlkPhos  63  02-09    PT/INR - ( 09 Feb 2024 17:54 )   PT: 15.3 sec;   INR: 1.37 ratio         PTT - ( 09 Feb 2024 17:54 )  PTT:29.1 sec  Urinalysis Basic - ( 09 Feb 2024 17:54 )    Color: x / Appearance: x / SG: x / pH: x  Gluc: 114 mg/dL / Ketone: x  / Bili: x / Urobili: x   Blood: x / Protein: x / Nitrite: x   Leuk Esterase: x / RBC: x / WBC x   Sq Epi: x / Non Sq Epi: x / Bacteria: x                RADIOLOGY, EKG & ADDITIONAL TESTS:     CT Angio Abdomen and Pelvis w/ IV Cont (02.09.24 @ 19:10)    FINDINGS:  LOWER CHEST: Small volume bibasilar atelectasis.    LIVER: Within normal limits.  BILE DUCTS: Normal caliber.  GALLBLADDER: Within normal limits.  SPLEEN: Parenchymal calcifications.  PANCREAS: Regional fatty infiltration about the pancreatic head. No   discrete enhancing lesion.  ADRENALS: Within normal limits.  KIDNEYS/URETERS: Within normal limits.    BLADDER: Within normal limits.  REPRODUCTIVE ORGANS: Prostate is mildly enlarged    BOWEL: Active arterial extravasation in the colon about the hepatic   flexure (series 303-32 through 42) with pooling on venous exam.    Appendectomy. Colonic diverticulosis.  PERITONEUM: No ascites.  VESSELS: Atherosclerotic changes.  RETROPERITONEUM/LYMPH NODES: No lymphadenopathy.  ABDOMINAL WALL: Postsurgical change.  BONES: Degenerative changes.    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

## 2024-02-09 NOTE — H&P ADULT - ATTENDING COMMENTS
pt is an 82 yo male with hx htn, hld, afib on eliquis, ana maría's esophagus  s/p colonoscopy one week ago, presents with episode of lower gi bleed,  In the er pt noted to have hgb 11 from baseline 13,   pt had ct angio showing  active gi bleed at the hepatic flexure. asked to evaluate for active gi bleed.  PE bp 120/76 rr 18 heent dry mucosa, neck supple, lungs clear  heart s1s2 abdomen mild epigastric tenderness, ext no edema,  neuro nonfocal,      wbc 16 hgb 11 hct 34  bicarb 24 cr o.91    ct angio reviewed, gi bleed in large colon hepatic flexure    A/p  82 yo male with active gi bleed on eliquis for afib  -s/p kcentra, will transfuse prbcs,  -serial cbc /plts q 4 hrs  -gi evaluation for possible colonoscopy  -echo to evaluate lv function  -monitor urine output  -hold eliquis for now, golytely prep  -dvt prophylaxis with scds  critically ill with acute gi bleed

## 2024-02-09 NOTE — PATIENT PROFILE ADULT - NSPROEXTENSIONSOFSELF_GEN_A_NUR
A/P  75 year old male with a history of DM2, HLD, persistent atrial fibrillation who underwent cardioversion in June 2021 and had a recurrence of Afib approximately 4 hours after cardioversion. He is now status post uncomplicated radiofrequency ablation of atrial fibrillation via b/l FV.    - Discharge home today
none

## 2024-02-09 NOTE — ED PROVIDER NOTE - PROGRESS NOTE DETAILS
LJ Crowley PGY-2: GI consulted, IR consulted, MICU consulted for active arterial bleed at the hepatic flexure noted on CT.  Vital signs stable.  Plan for blood transfusion, Kcentra. Lorena GAMBOA: (Back Charting) discussed ct findings w radiology, c/f active art gib.

## 2024-02-09 NOTE — H&P ADULT - ASSESSMENT
The patient is an 84yo man history of A-fib on Eliquis, hypertension, hyperlipidemia, Davis's esophagus, BPH who presented wtih 1 day of rectal bleeding following resumption of eliquis s/p recent polyp removal. Admitted to MICU for close hemodynamic monitoring with scope planned for 2/10 in the AM.      == NEURO ==    #Baseline  A&Ox3, no active issues  Tylenol PRN for pain  No sedation    == CARDIOVASCULAR ==    #Baseline  RRR, no MRG    #Paroxysmal A Fib  - Hold home eliquis i/s/o clinically significant GI bleed  - Holding home metoprolol i/s/o clinically significant GI bleed    #HTN  - Hold home antihypertensives i/s/o active bleed    #HLD  - Resume home atorvastatin post-scope    == RESPIRATORY ==    #Baseline  - Satting well on RA, no active issues at this time      == RENAL ==    #Baseline  - Serum BUN/Cr: 13/0.91  - Baseline BUN/Cr: 11/0.82  - Trend Cr   - Montior I&Os    == GI ==    #Active Arterial Bleed  - CTA AP demonstrated active GI bleed at hepatic flexure, correlates with previous polyp removal on 2/2  - GI following, to scope in AM (2/10)  - Administer Golytely prep  - NPO at midnight  - Hold home AC  - s/p K Centra administration  - Start Pantoprazole 40mg IV BID    - Baseline Hgb 13; admission Hgb 11.1  - Trend H/H q4h  - s/p 1 uPRBC in ED  - If patient doesn't respond to PRBC transfusion appropriately, transfuse FFP; after which proceed with 3:1:1 ratio of PRBC:Plt:FFP  - Transfusion goal of Hgb > 7, Plt > 50 (active bleed)       == ENDO ==    No active issues    == HEME/ONC ==    #Active GI Bleed  - Baseline Hgb 13; admission Hgb 11.1  - Trend H/H q4h  - s/p 1 uPRBC in ED  - If patient doesn't respond to PRBC transfusion appropriately, transfuse FFP; after which proceed with 3:1:1 ratio of PRBC:Plt:FFP  - Transfusion goal of Hgb > 7, Plt > 50 (active bleed)    == ID ==  - Stable WBC, afebrile  - observe off abx    LINES/PPX  - peripherals in tact  - DVT PPx: SCDs i/s/o active bleed  - GOC: Full code The patient is an 82yo man history of A-fib on Eliquis, hypertension, hyperlipidemia, Davis's esophagus, BPH who presented wtih 1 day of rectal bleeding following resumption of eliquis s/p recent polyp removal. Admitted to MICU for close hemodynamic monitoring with scope planned for 2/10 in the AM.      == NEURO ==    #Baseline  Dementia, sometimes confused on assessment  AOx3  Tylenol PRN for pain  No sedation    == CARDIOVASCULAR ==    #Baseline  RRR, no MRG    #Paroxysmal A Fib  - Hold home eliquis i/s/o clinically significant GI bleed  - Holding home metoprolol i/s/o clinically significant GI bleed    #HTN  - Hold home antihypertensives i/s/o active bleed    #HLD  - Resume home atorvastatin post-scope    == RESPIRATORY ==    #Baseline  - Satting well on RA, no active issues at this time      == RENAL ==    #Baseline  - Serum BUN/Cr: 13/0.91  - Baseline BUN/Cr: 11/0.82  - Trend Cr   - Montior I&Os    == GI ==    #Active Arterial Bleed  - CTA AP demonstrated active GI bleed at hepatic flexure, correlates with previous polyp removal on 2/2  - GI following, to scope in AM (2/10)  - Administer Golytely prep  - NPO at midnight  - Hold home AC  - s/p K Centra administration  - Start Pantoprazole 40mg IV BID    - Baseline Hgb 13; admission Hgb 11.1  - Trend H/H q4h  - s/p 1 uPRBC in ED  - If patient doesn't respond to PRBC transfusion appropriately, transfuse FFP; after which proceed with 3:1:1 ratio of PRBC:Plt:FFP  - Transfusion goal of Hgb > 7, Plt > 50 (active bleed)       == ENDO ==    No active issues    == HEME/ONC ==    #Active GI Bleed  - Baseline Hgb 13; admission Hgb 11.1  - Trend H/H q4h  - s/p 1 uPRBC in ED  - If patient doesn't respond to PRBC transfusion appropriately, transfuse FFP; after which proceed with 3:1:1 ratio of PRBC:Plt:FFP  - Transfusion goal of Hgb > 7, Plt > 50 (active bleed)    == ID ==  - Stable WBC, afebrile  - observe off abx    LINES/PPX  - peripherals in tact  - DVT PPx: SCDs i/s/o active bleed  - GOC: Full code

## 2024-02-09 NOTE — PATIENT PROFILE ADULT - FALL HARM RISK - HARM RISK INTERVENTIONS

## 2024-02-09 NOTE — PATIENT PROFILE ADULT - NSPRESCRALCFREQ_GEN_A_NUR
Monthly or less Patient poor historian. Pt is unable to answer questions due to limited cognition/Monthly or less Patient poor historian. Pt is unable to answer questions due to limited cognition/Never

## 2024-02-09 NOTE — ED ADULT TRIAGE NOTE - CHIEF COMPLAINT QUOTE
Patient brought to ER by EMS from home for active rectal (GI Bleed). Syncopal episode, orthostatic lying 150/100 sitting 90/60. Off Eliquis for coloscopy last week and went back on Eliquis Wednesday. Patient has an 18 gauge to left antecubital with normal saline infusing. Patient has dementia and wife is at bedside.

## 2024-02-09 NOTE — H&P ADULT - NSHPPHYSICALEXAM_GEN_ALL_CORE
Vital Signs Last 12 Hrs  T(F): 97.2 (02-09-24 @ 19:00), Max: 98.1 (02-09-24 @ 18:02)  HR: 85 (02-09-24 @ 19:00) (70 - 85)  BP: 132/87 (02-09-24 @ 19:00) (100/60 - 145/78)  BP(mean): --  RR: 18 (02-09-24 @ 19:00) (18 - 18)  SpO2: 100% (02-09-24 @ 19:00) (98% - 100%)    PHYSICAL EXAM:  Constitutional: NAD, comfortable in bed.  HEENT: NC/AT, PERRLA, EOMI, no conjunctival pallor or scleral icterus, MMM  Neck: Supple, no JVD  Respiratory: CTA B/L. No w/r/r.   Cardiovascular: RRR, normal S1 and S2, no m/r/g.   Gastrointestinal: +BS, soft NTND, no guarding or rebound tenderness, no palpable masses   Extremities: wwp; no cyanosis, clubbing or edema.   Vascular: Pulses equal and strong throughout.   Neurological: AAOx3, no CN deficits, strength and sensation intact throughout.   Skin: No gross skin abnormalities or rashes

## 2024-02-10 LAB
ALBUMIN SERPL ELPH-MCNC: 3.2 G/DL — LOW (ref 3.3–5)
ALP SERPL-CCNC: 49 U/L — SIGNIFICANT CHANGE UP (ref 40–120)
ALT FLD-CCNC: 13 U/L — SIGNIFICANT CHANGE UP (ref 4–41)
ANION GAP SERPL CALC-SCNC: 9 MMOL/L — SIGNIFICANT CHANGE UP (ref 7–14)
AST SERPL-CCNC: 19 U/L — SIGNIFICANT CHANGE UP (ref 4–40)
BASE EXCESS BLDV CALC-SCNC: -1.1 MMOL/L — SIGNIFICANT CHANGE UP (ref -2–3)
BASOPHILS # BLD AUTO: 0.09 K/UL — SIGNIFICANT CHANGE UP (ref 0–0.2)
BASOPHILS # BLD AUTO: 0.11 K/UL — SIGNIFICANT CHANGE UP (ref 0–0.2)
BASOPHILS # BLD AUTO: 0.11 K/UL — SIGNIFICANT CHANGE UP (ref 0–0.2)
BASOPHILS # BLD AUTO: 0.14 K/UL — SIGNIFICANT CHANGE UP (ref 0–0.2)
BASOPHILS NFR BLD AUTO: 0.9 % — SIGNIFICANT CHANGE UP (ref 0–2)
BASOPHILS NFR BLD AUTO: 1 % — SIGNIFICANT CHANGE UP (ref 0–2)
BASOPHILS NFR BLD AUTO: 1.1 % — SIGNIFICANT CHANGE UP (ref 0–2)
BASOPHILS NFR BLD AUTO: 1.1 % — SIGNIFICANT CHANGE UP (ref 0–2)
BILIRUB DIRECT SERPL-MCNC: 0.3 MG/DL — SIGNIFICANT CHANGE UP (ref 0–0.3)
BILIRUB SERPL-MCNC: 1.6 MG/DL — HIGH (ref 0.2–1.2)
BLOOD GAS VENOUS COMPREHENSIVE RESULT: SIGNIFICANT CHANGE UP
BUN SERPL-MCNC: 15 MG/DL — SIGNIFICANT CHANGE UP (ref 7–23)
CALCIUM SERPL-MCNC: 8 MG/DL — LOW (ref 8.4–10.5)
CHLORIDE BLDV-SCNC: 107 MMOL/L — SIGNIFICANT CHANGE UP (ref 96–108)
CHLORIDE SERPL-SCNC: 107 MMOL/L — SIGNIFICANT CHANGE UP (ref 98–107)
CO2 BLDV-SCNC: 25.6 MMOL/L — SIGNIFICANT CHANGE UP (ref 22–26)
CO2 SERPL-SCNC: 23 MMOL/L — SIGNIFICANT CHANGE UP (ref 22–31)
CREAT SERPL-MCNC: 0.8 MG/DL — SIGNIFICANT CHANGE UP (ref 0.5–1.3)
EGFR: 88 ML/MIN/1.73M2 — SIGNIFICANT CHANGE UP
EOSINOPHIL # BLD AUTO: 0.23 K/UL — SIGNIFICANT CHANGE UP (ref 0–0.5)
EOSINOPHIL # BLD AUTO: 0.36 K/UL — SIGNIFICANT CHANGE UP (ref 0–0.5)
EOSINOPHIL # BLD AUTO: 0.38 K/UL — SIGNIFICANT CHANGE UP (ref 0–0.5)
EOSINOPHIL # BLD AUTO: 0.51 K/UL — HIGH (ref 0–0.5)
EOSINOPHIL NFR BLD AUTO: 2.5 % — SIGNIFICANT CHANGE UP (ref 0–6)
EOSINOPHIL NFR BLD AUTO: 3.1 % — SIGNIFICANT CHANGE UP (ref 0–6)
EOSINOPHIL NFR BLD AUTO: 3.7 % — SIGNIFICANT CHANGE UP (ref 0–6)
EOSINOPHIL NFR BLD AUTO: 4.1 % — SIGNIFICANT CHANGE UP (ref 0–6)
GAS PNL BLDV: 136 MMOL/L — SIGNIFICANT CHANGE UP (ref 136–145)
GLUCOSE BLDC GLUCOMTR-MCNC: 99 MG/DL — SIGNIFICANT CHANGE UP (ref 70–99)
GLUCOSE BLDV-MCNC: 117 MG/DL — HIGH (ref 70–99)
GLUCOSE SERPL-MCNC: 100 MG/DL — HIGH (ref 70–99)
HAPTOGLOB SERPL-MCNC: 133 MG/DL — SIGNIFICANT CHANGE UP (ref 34–200)
HCO3 BLDV-SCNC: 24 MMOL/L — SIGNIFICANT CHANGE UP (ref 22–29)
HCT VFR BLD CALC: 24.3 % — LOW (ref 39–50)
HCT VFR BLD CALC: 26.2 % — LOW (ref 39–50)
HCT VFR BLD CALC: 28.6 % — LOW (ref 39–50)
HCT VFR BLD CALC: 29.1 % — LOW (ref 39–50)
HCT VFR BLDA CALC: 26 % — LOW (ref 39–51)
HGB BLD CALC-MCNC: 8.8 G/DL — LOW (ref 12.6–17.4)
HGB BLD-MCNC: 8.2 G/DL — LOW (ref 13–17)
HGB BLD-MCNC: 8.8 G/DL — LOW (ref 13–17)
HGB BLD-MCNC: 9.5 G/DL — LOW (ref 13–17)
HGB BLD-MCNC: 9.6 G/DL — LOW (ref 13–17)
IANC: 5.82 K/UL — SIGNIFICANT CHANGE UP (ref 1.8–7.4)
IANC: 6.11 K/UL — SIGNIFICANT CHANGE UP (ref 1.8–7.4)
IANC: 8.08 K/UL — HIGH (ref 1.8–7.4)
IANC: 8.51 K/UL — HIGH (ref 1.8–7.4)
IMM GRANULOCYTES NFR BLD AUTO: 0.2 % — SIGNIFICANT CHANGE UP (ref 0–0.9)
IMM GRANULOCYTES NFR BLD AUTO: 0.3 % — SIGNIFICANT CHANGE UP (ref 0–0.9)
IMM GRANULOCYTES NFR BLD AUTO: 0.5 % — SIGNIFICANT CHANGE UP (ref 0–0.9)
IMM GRANULOCYTES NFR BLD AUTO: 0.5 % — SIGNIFICANT CHANGE UP (ref 0–0.9)
INR BLD: 1.1 RATIO — SIGNIFICANT CHANGE UP (ref 0.85–1.18)
LACTATE BLDV-MCNC: 1.4 MMOL/L — SIGNIFICANT CHANGE UP (ref 0.5–2)
LYMPHOCYTES # BLD AUTO: 18.4 % — SIGNIFICANT CHANGE UP (ref 13–44)
LYMPHOCYTES # BLD AUTO: 2.07 K/UL — SIGNIFICANT CHANGE UP (ref 1–3.3)
LYMPHOCYTES # BLD AUTO: 2.25 K/UL — SIGNIFICANT CHANGE UP (ref 1–3.3)
LYMPHOCYTES # BLD AUTO: 2.57 K/UL — SIGNIFICANT CHANGE UP (ref 1–3.3)
LYMPHOCYTES # BLD AUTO: 2.8 K/UL — SIGNIFICANT CHANGE UP (ref 1–3.3)
LYMPHOCYTES # BLD AUTO: 22.2 % — SIGNIFICANT CHANGE UP (ref 13–44)
LYMPHOCYTES # BLD AUTO: 22.3 % — SIGNIFICANT CHANGE UP (ref 13–44)
LYMPHOCYTES # BLD AUTO: 26.2 % — SIGNIFICANT CHANGE UP (ref 13–44)
MAGNESIUM SERPL-MCNC: 2 MG/DL — SIGNIFICANT CHANGE UP (ref 1.6–2.6)
MCHC RBC-ENTMCNC: 29 PG — SIGNIFICANT CHANGE UP (ref 27–34)
MCHC RBC-ENTMCNC: 29.1 PG — SIGNIFICANT CHANGE UP (ref 27–34)
MCHC RBC-ENTMCNC: 29.3 PG — SIGNIFICANT CHANGE UP (ref 27–34)
MCHC RBC-ENTMCNC: 29.5 PG — SIGNIFICANT CHANGE UP (ref 27–34)
MCHC RBC-ENTMCNC: 33 GM/DL — SIGNIFICANT CHANGE UP (ref 32–36)
MCHC RBC-ENTMCNC: 33.2 GM/DL — SIGNIFICANT CHANGE UP (ref 32–36)
MCHC RBC-ENTMCNC: 33.6 GM/DL — SIGNIFICANT CHANGE UP (ref 32–36)
MCHC RBC-ENTMCNC: 33.7 GM/DL — SIGNIFICANT CHANGE UP (ref 32–36)
MCV RBC AUTO: 87.3 FL — SIGNIFICANT CHANGE UP (ref 80–100)
MCV RBC AUTO: 87.4 FL — SIGNIFICANT CHANGE UP (ref 80–100)
MCV RBC AUTO: 87.7 FL — SIGNIFICANT CHANGE UP (ref 80–100)
MCV RBC AUTO: 87.9 FL — SIGNIFICANT CHANGE UP (ref 80–100)
MONOCYTES # BLD AUTO: 0.81 K/UL — SIGNIFICANT CHANGE UP (ref 0–0.9)
MONOCYTES # BLD AUTO: 0.91 K/UL — HIGH (ref 0–0.9)
MONOCYTES # BLD AUTO: 0.94 K/UL — HIGH (ref 0–0.9)
MONOCYTES # BLD AUTO: 0.94 K/UL — HIGH (ref 0–0.9)
MONOCYTES NFR BLD AUTO: 7.5 % — SIGNIFICANT CHANGE UP (ref 2–14)
MONOCYTES NFR BLD AUTO: 7.7 % — SIGNIFICANT CHANGE UP (ref 2–14)
MONOCYTES NFR BLD AUTO: 8.7 % — SIGNIFICANT CHANGE UP (ref 2–14)
MONOCYTES NFR BLD AUTO: 9.3 % — SIGNIFICANT CHANGE UP (ref 2–14)
NEUTROPHILS # BLD AUTO: 5.82 K/UL — SIGNIFICANT CHANGE UP (ref 1.8–7.4)
NEUTROPHILS # BLD AUTO: 6.11 K/UL — SIGNIFICANT CHANGE UP (ref 1.8–7.4)
NEUTROPHILS # BLD AUTO: 8.08 K/UL — HIGH (ref 1.8–7.4)
NEUTROPHILS # BLD AUTO: 8.51 K/UL — HIGH (ref 1.8–7.4)
NEUTROPHILS NFR BLD AUTO: 59.2 % — SIGNIFICANT CHANGE UP (ref 43–77)
NEUTROPHILS NFR BLD AUTO: 64.5 % — SIGNIFICANT CHANGE UP (ref 43–77)
NEUTROPHILS NFR BLD AUTO: 65.3 % — SIGNIFICANT CHANGE UP (ref 43–77)
NEUTROPHILS NFR BLD AUTO: 69.7 % — SIGNIFICANT CHANGE UP (ref 43–77)
NRBC # BLD: 0 /100 WBCS — SIGNIFICANT CHANGE UP (ref 0–0)
NRBC # FLD: 0 K/UL — SIGNIFICANT CHANGE UP (ref 0–0)
PCO2 BLDV: 43 MMHG — SIGNIFICANT CHANGE UP (ref 42–55)
PH BLDV: 7.36 — SIGNIFICANT CHANGE UP (ref 7.32–7.43)
PHOSPHATE SERPL-MCNC: 2.6 MG/DL — SIGNIFICANT CHANGE UP (ref 2.5–4.5)
PLATELET # BLD AUTO: 189 K/UL — SIGNIFICANT CHANGE UP (ref 150–400)
PLATELET # BLD AUTO: 208 K/UL — SIGNIFICANT CHANGE UP (ref 150–400)
PLATELET # BLD AUTO: 215 K/UL — SIGNIFICANT CHANGE UP (ref 150–400)
PLATELET # BLD AUTO: 223 K/UL — SIGNIFICANT CHANGE UP (ref 150–400)
PO2 BLDV: 38 MMHG — SIGNIFICANT CHANGE UP (ref 25–45)
POTASSIUM BLDV-SCNC: 4.1 MMOL/L — SIGNIFICANT CHANGE UP (ref 3.5–5.1)
POTASSIUM SERPL-MCNC: 4.3 MMOL/L — SIGNIFICANT CHANGE UP (ref 3.5–5.3)
POTASSIUM SERPL-SCNC: 4.3 MMOL/L — SIGNIFICANT CHANGE UP (ref 3.5–5.3)
PROT SERPL-MCNC: 5.1 G/DL — LOW (ref 6–8.3)
PROTHROM AB SERPL-ACNC: 12.3 SEC — SIGNIFICANT CHANGE UP (ref 9.5–13)
RBC # BLD: 2.78 M/UL — LOW (ref 4.2–5.8)
RBC # BLD: 3 M/UL — LOW (ref 4.2–5.8)
RBC # BLD: 3.26 M/UL — LOW (ref 4.2–5.8)
RBC # BLD: 3.31 M/UL — LOW (ref 4.2–5.8)
RBC # FLD: 14.3 % — SIGNIFICANT CHANGE UP (ref 10.3–14.5)
RBC # FLD: 14.5 % — SIGNIFICANT CHANGE UP (ref 10.3–14.5)
RBC # FLD: 14.6 % — HIGH (ref 10.3–14.5)
RBC # FLD: 14.6 % — HIGH (ref 10.3–14.5)
SAO2 % BLDV: 66.2 % — LOW (ref 67–88)
SODIUM SERPL-SCNC: 139 MMOL/L — SIGNIFICANT CHANGE UP (ref 135–145)
WBC # BLD: 12.22 K/UL — HIGH (ref 3.8–10.5)
WBC # BLD: 12.53 K/UL — HIGH (ref 3.8–10.5)
WBC # BLD: 9.34 K/UL — SIGNIFICANT CHANGE UP (ref 3.8–10.5)
WBC # BLD: 9.82 K/UL — SIGNIFICANT CHANGE UP (ref 3.8–10.5)
WBC # FLD AUTO: 12.22 K/UL — HIGH (ref 3.8–10.5)
WBC # FLD AUTO: 12.53 K/UL — HIGH (ref 3.8–10.5)
WBC # FLD AUTO: 9.34 K/UL — SIGNIFICANT CHANGE UP (ref 3.8–10.5)
WBC # FLD AUTO: 9.82 K/UL — SIGNIFICANT CHANGE UP (ref 3.8–10.5)

## 2024-02-10 PROCEDURE — 71045 X-RAY EXAM CHEST 1 VIEW: CPT | Mod: 26

## 2024-02-10 PROCEDURE — 99222 1ST HOSP IP/OBS MODERATE 55: CPT | Mod: GC,25

## 2024-02-10 PROCEDURE — 99291 CRITICAL CARE FIRST HOUR: CPT

## 2024-02-10 PROCEDURE — 45382 COLONOSCOPY W/CONTROL BLEED: CPT | Mod: GC

## 2024-02-10 DEVICE — CLIP RESOLUTION 360 ULTRA 235CM 20/BX: Type: IMPLANTABLE DEVICE | Status: FUNCTIONAL

## 2024-02-10 RX ORDER — PANTOPRAZOLE SODIUM 20 MG/1
40 TABLET, DELAYED RELEASE ORAL
Refills: 0 | Status: DISCONTINUED | OUTPATIENT
Start: 2024-02-10 | End: 2024-02-10

## 2024-02-10 RX ORDER — PANTOPRAZOLE SODIUM 20 MG/1
40 TABLET, DELAYED RELEASE ORAL DAILY
Refills: 0 | Status: DISCONTINUED | OUTPATIENT
Start: 2024-02-10 | End: 2024-02-11

## 2024-02-10 RX ORDER — QUETIAPINE FUMARATE 200 MG/1
12.5 TABLET, FILM COATED ORAL AT BEDTIME
Refills: 0 | Status: DISCONTINUED | OUTPATIENT
Start: 2024-02-10 | End: 2024-02-12

## 2024-02-10 RX ORDER — NOREPINEPHRINE BITARTRATE/D5W 8 MG/250ML
0.05 PLASTIC BAG, INJECTION (ML) INTRAVENOUS
Qty: 8 | Refills: 0 | Status: DISCONTINUED | OUTPATIENT
Start: 2024-02-10 | End: 2024-02-10

## 2024-02-10 RX ORDER — DEXMEDETOMIDINE HYDROCHLORIDE IN 0.9% SODIUM CHLORIDE 4 UG/ML
0.5 INJECTION INTRAVENOUS
Qty: 400 | Refills: 0 | Status: DISCONTINUED | OUTPATIENT
Start: 2024-02-10 | End: 2024-02-10

## 2024-02-10 RX ORDER — HALOPERIDOL DECANOATE 100 MG/ML
5 INJECTION INTRAMUSCULAR ONCE
Refills: 0 | Status: COMPLETED | OUTPATIENT
Start: 2024-02-10 | End: 2024-02-10

## 2024-02-10 RX ORDER — ONDANSETRON 8 MG/1
4 TABLET, FILM COATED ORAL ONCE
Refills: 0 | Status: COMPLETED | OUTPATIENT
Start: 2024-02-10 | End: 2024-02-10

## 2024-02-10 RX ORDER — FENTANYL CITRATE 50 UG/ML
25 INJECTION INTRAVENOUS ONCE
Refills: 0 | Status: DISCONTINUED | OUTPATIENT
Start: 2024-02-10 | End: 2024-02-10

## 2024-02-10 RX ORDER — MIDAZOLAM HYDROCHLORIDE 1 MG/ML
0.5 INJECTION, SOLUTION INTRAMUSCULAR; INTRAVENOUS ONCE
Refills: 0 | Status: DISCONTINUED | OUTPATIENT
Start: 2024-02-10 | End: 2024-02-10

## 2024-02-10 RX ORDER — DEXMEDETOMIDINE HYDROCHLORIDE IN 0.9% SODIUM CHLORIDE 4 UG/ML
0.5 INJECTION INTRAVENOUS
Qty: 200 | Refills: 0 | Status: DISCONTINUED | OUTPATIENT
Start: 2024-02-10 | End: 2024-02-10

## 2024-02-10 RX ORDER — HALOPERIDOL DECANOATE 100 MG/ML
5 INJECTION INTRAMUSCULAR ONCE
Refills: 0 | Status: DISCONTINUED | OUTPATIENT
Start: 2024-02-10 | End: 2024-02-10

## 2024-02-10 RX ADMIN — QUETIAPINE FUMARATE 12.5 MILLIGRAM(S): 200 TABLET, FILM COATED ORAL at 00:05

## 2024-02-10 RX ADMIN — HALOPERIDOL DECANOATE 5 MILLIGRAM(S): 100 INJECTION INTRAMUSCULAR at 20:23

## 2024-02-10 RX ADMIN — PANTOPRAZOLE SODIUM 40 MILLIGRAM(S): 20 TABLET, DELAYED RELEASE ORAL at 12:33

## 2024-02-10 RX ADMIN — DEXMEDETOMIDINE HYDROCHLORIDE IN 0.9% SODIUM CHLORIDE 10.4 MICROGRAM(S)/KG/HR: 4 INJECTION INTRAVENOUS at 05:29

## 2024-02-10 RX ADMIN — ONDANSETRON 4 MILLIGRAM(S): 8 TABLET, FILM COATED ORAL at 10:22

## 2024-02-10 RX ADMIN — MIDAZOLAM HYDROCHLORIDE 0.5 MILLIGRAM(S): 1 INJECTION, SOLUTION INTRAMUSCULAR; INTRAVENOUS at 08:49

## 2024-02-10 RX ADMIN — QUETIAPINE FUMARATE 12.5 MILLIGRAM(S): 200 TABLET, FILM COATED ORAL at 21:50

## 2024-02-10 RX ADMIN — Medication 7.8 MICROGRAM(S)/KG/MIN: at 01:23

## 2024-02-10 RX ADMIN — FENTANYL CITRATE 25 MICROGRAM(S): 50 INJECTION INTRAVENOUS at 08:49

## 2024-02-10 RX ADMIN — FENTANYL CITRATE 25 MICROGRAM(S): 50 INJECTION INTRAVENOUS at 08:57

## 2024-02-10 NOTE — CONSULT NOTE ADULT - SUBJECTIVE AND OBJECTIVE BOX
Initial GI Consult    Patient is a 83y old  Male who presents with a chief complaint of GI Bleed    HPI: 84 y/o M with A-fib on Eliquis (last dose 8:30AM today), hypertension, hyperlipidemia, Davis's esophagus, BPH, presents with rectal bleeding for 1 day. Patient underwent colonoscopy with EMR of 5cm polyp in ascending colon with Dr. London at [facility] on 2/2. Resection and retrieval were complete. Ablation of visible vessel and margin with soft coag using hot biopsy forcep. Purastat (6 mL) was applied to prevent delayed bleeding. The patient did well afterward went home after procedure. He resumed his home Eliquis 3 days ago.  Today the patient began having bloody diarrhea with a syncopal episode.     In the ED, the patient was HDS. Hbg 11.1 (baseline 13). CTA abd/pelvis positive for arterial bleed in hepatic flexure, likely at site of polyp resection. IR called for embolization but they are deferring to GI. Patient brought to MICU where he was given 4L golytly; he drank a little more than half of prep and vomited with likely aspiration given O2 and BP dropped. He has gotten 2U prbc and was on levo briefly throughout the night. He is continuing to have red liquid stool. In conversation with MICU team, they are concerned about recurrent vomiting and aspiration with additional prep via NGT. IR notified and will be on standby if poor views on colonoscopy this AM.    PAST MEDICAL & SURGICAL HISTORY:  Gout      Afib      Barretts esophagus      H/O asbestosis      Hypertension      Hypercholesteremia      History of short term memory loss  follows up with neurologist      Glaucoma      Polyp of colon      History of BPH      H/O hernia repair      S/P appendectomy  12/26/2020      S/P laser trabeculoplasty of eye        FAMILY HISTORY:  Family history of stroke (Father)        MEDS:  MEDICATIONS  (STANDING):  dexMEDEtomidine Infusion 0.5 MICROgram(s)/kG/Hr (10.4 mL/Hr) IV Continuous <Continuous>  norepinephrine Infusion 0.05 MICROgram(s)/kG/Min (7.8 mL/Hr) IV Continuous <Continuous>    MEDICATIONS  (PRN):    Allergies    strawberry (Stomach Upset)  penicillins (Unknown)    Intolerances          CONSTITUTIONAL:  No weight loss, fever, chills, weakness or fatigue.  HEENT:  Eyes:  No visual loss, blurred vision, double vision or yellow sclerae.  SKIN:  No rash or itching.  CARDIOVASCULAR:  No chest pain, chest pressure or chest discomfort.  RESPIRATORY:  No shortness of breath, cough or sputum.  GASTROINTESTINAL:  SEE HPI  GENITOURINARY:  No dysuria, hematuria, urinary frequency  NEUROLOGICAL:  No headache, dizziness, syncope, paralysis, ataxia, numbness or tingling in the extremities.  MUSCULOSKELETAL:  No muscle, back pain, joint pain or stiffness.  ENDOCRINOLOGIC:  No reports of sweating, cold or heat intolerance.     ______________________________________________________________________  PHYSICAL EXAM:  T(C): 37.1 (02-10-24 @ 04:00), Max: 37.1 (02-10-24 @ 00:00)  HR: 60 (02-10-24 @ 06:00)  BP: 86/33 (02-10-24 @ 06:00)  RR: 13 (02-10-24 @ 06:00)  SpO2: 98% (02-10-24 @ 06:00)  Wt(kg): --    02-09  -  02-10  --------------------------------------------------------  IN:  Total IN: 0 mL    OUT:    Voided (mL): 200 mL  Total OUT: 200 mL    Total NET: -200 mL          GEN: NAD, normocephalic  CVS: S1S2+  CHEST: clear to auscultation  ABD: soft , nontender, nondistended, bowel sounds present  EXTR: no cyanosis, no clubbing, no edema  NEURO: A&OX  SKIN:  warm;  non icteric    ______________________________________________________________________  LABS:                        8.8    9.82  )-----------( 223      ( 10 Feb 2024 01:19 )             26.2     02-10    139  |  107  |  15  ----------------------------<  100<H>  4.3   |  23  |  0.80    Ca    8.0<L>      10 Feb 2024 01:19  Phos  2.6     02-10  Mg     2.00     02-10    TPro  5.1<L>  /  Alb  3.2<L>  /  TBili  1.6<H>  /  DBili  x   /  AST  19  /  ALT  13  /  AlkPhos  49  02-10    LIVER FUNCTIONS - ( 10 Feb 2024 01:19 )  Alb: 3.2 g/dL / Pro: 5.1 g/dL / ALK PHOS: 49 U/L / ALT: 13 U/L / AST: 19 U/L / GGT: x           PT/INR - ( 10 Feb 2024 01:19 )   PT: 12.3 sec;   INR: 1.10 ratio         PTT - ( 09 Feb 2024 17:54 )  PTT:29.1 sec  ____________________________________________         Initial GI Consult    Patient is a 83y old  Male who presents with a chief complaint of GI Bleed    HPI: 82 y/o M with A-fib on Eliquis (last dose 8:30AM today), hypertension, hyperlipidemia, Davis's esophagus, BPH, presents with rectal bleeding for 1 day. Patient underwent colonoscopy with EMR of 5cm polyp in ascending colon with Dr. London at [facility] on 2/2. Resection and retrieval were complete. Ablation of visible vessel and margin with soft coag using hot biopsy forcep. Purastat (6 mL) was applied to prevent delayed bleeding. The patient did well afterward went home after procedure. He resumed his home Eliquis 3 days ago.  Today the patient began having bloody diarrhea with a syncopal episode.     In the ED, the patient was HDS. Hbg 11.1 (baseline 13). CTA abd/pelvis positive for arterial bleed in hepatic flexure, likely at site of polyp resection. IR called for embolization but they are deferring to GI. Patient brought to MICU where he was given 4L golytly; he drank a little more than half of prep and vomited with likely aspiration given O2 and BP dropped. He has gotten 2U prbc and was on levo briefly throughout the night. He is continuing to have red liquid stool. In conversation with MICU team, they are concerned about recurrent vomiting and aspiration with additional prep via NGT. IR notified and will be on standby if poor views on colonoscopy this AM.    PAST MEDICAL & SURGICAL HISTORY:  Gout      Afib      Barretts esophagus      H/O asbestosis      Hypertension      Hypercholesteremia      History of short term memory loss  follows up with neurologist      Glaucoma      Polyp of colon      History of BPH      H/O hernia repair      S/P appendectomy  12/26/2020      S/P laser trabeculoplasty of eye        FAMILY HISTORY:  Family history of stroke (Father)        MEDS:  MEDICATIONS  (STANDING):  dexMEDEtomidine Infusion 0.5 MICROgram(s)/kG/Hr (10.4 mL/Hr) IV Continuous <Continuous>  norepinephrine Infusion 0.05 MICROgram(s)/kG/Min (7.8 mL/Hr) IV Continuous <Continuous>    MEDICATIONS  (PRN):    Allergies    strawberry (Stomach Upset)  penicillins (Unknown)    Intolerances          CONSTITUTIONAL:  No weight loss, fever, chills, weakness or fatigue.  HEENT:  Eyes:  No visual loss, blurred vision, double vision or yellow sclerae.  SKIN:  No rash or itching.  CARDIOVASCULAR:  No chest pain, chest pressure or chest discomfort.  RESPIRATORY:  No shortness of breath, cough or sputum.  GASTROINTESTINAL:  SEE HPI  GENITOURINARY:  No dysuria, hematuria, urinary frequency  NEUROLOGICAL:  No headache, dizziness, syncope, paralysis, ataxia, numbness or tingling in the extremities.  MUSCULOSKELETAL:  No muscle, back pain, joint pain or stiffness.  ENDOCRINOLOGIC:  No reports of sweating, cold or heat intolerance.     ______________________________________________________________________  PHYSICAL EXAM:  T(C): 37.1 (02-10-24 @ 04:00), Max: 37.1 (02-10-24 @ 00:00)  HR: 60 (02-10-24 @ 06:00)  BP: 86/33 (02-10-24 @ 06:00)  RR: 13 (02-10-24 @ 06:00)  SpO2: 98% (02-10-24 @ 06:00)  Wt(kg): --    02-09  -  02-10  --------------------------------------------------------  IN:  Total IN: 0 mL    OUT:    Voided (mL): 200 mL  Total OUT: 200 mL    Total NET: -200 mL          GEN: NAD, normocephalic  CVS: S1S2+  CHEST: clear to auscultation  ABD: soft , nontender, nondistended, bowel sounds present  EXTR: no cyanosis, no clubbing, no edema  NEURO: A&OX3  SKIN:  warm;  non icteric    ______________________________________________________________________  LABS:                        8.8    9.82  )-----------( 223      ( 10 Feb 2024 01:19 )             26.2     02-10    139  |  107  |  15  ----------------------------<  100<H>  4.3   |  23  |  0.80    Ca    8.0<L>      10 Feb 2024 01:19  Phos  2.6     02-10  Mg     2.00     02-10    TPro  5.1<L>  /  Alb  3.2<L>  /  TBili  1.6<H>  /  DBili  x   /  AST  19  /  ALT  13  /  AlkPhos  49  02-10    LIVER FUNCTIONS - ( 10 Feb 2024 01:19 )  Alb: 3.2 g/dL / Pro: 5.1 g/dL / ALK PHOS: 49 U/L / ALT: 13 U/L / AST: 19 U/L / GGT: x           PT/INR - ( 10 Feb 2024 01:19 )   PT: 12.3 sec;   INR: 1.10 ratio         PTT - ( 09 Feb 2024 17:54 )  PTT:29.1 sec  ____________________________________________

## 2024-02-10 NOTE — CONSULT NOTE ADULT - ATTENDING COMMENTS
Patient seen and examined. Agree w above. Discussed case w wife over phone. s/p EMR of ascending colon polyp now w hematochezia. Patient w hx of Afib on Xarelto. Rec colonoscopy for most probable post-polypectomy bleed. Risks/benefits explained to patient's wife who stated understanding.

## 2024-02-10 NOTE — CHART NOTE - NSCHARTNOTEFT_GEN_A_CORE
Code RUPA called for pt trying to leave his room to "go to a party." On arrival, pt was able to be re-directed to go back into bed but continued to talk about a party that he was invited to that he couldn't miss. Pt with hx dementia and AOx1 during RUPA. Pt non-aggressive but continued to insist he needed to attend a party and trying to get out of bed; unable to be further redirected. Haldol 5mg IVP given and seroquel 12.5mg ordered for bed time.

## 2024-02-10 NOTE — CONSULT NOTE ADULT - ASSESSMENT
84 y/o M with A-fib on Eliquis (last dose 8:30AM today), hypertension, hyperlipidemia, Davis's esophagus, BPH, presents with rectal bleeding for 1 day. Patient underwent colonoscopy with EMR of 5cm polyp in ascending colon with Dr. London at [facility] on 2/2. Resection and retrieval were complete. Ablation of visible vessel and margin with soft coag using hot biopsy forcep. Purastat (6 mL) was applied to prevent delayed bleeding. CTA positive for active arterial bleed at hepatic flexure. Patient brought to MICU for prep overnight and colonoscopy this AM.      #LGIB  - CTA with positive active a. bleed at hepatic flexure   - IR consulted, plan for colonoscopy first to manage bleed  - off AC now for 24 hours, s/p Kcentra     *Patient drank a little more than half of prep and vomited with likely aspiration given O2 and BP dropped. He has gotten 2U prbc and was on levo briefly throughout the night. He is continuing to have red liquid stool. In conversation with MICU team, they are concerned about recurrent vomiting and aspiration with additional prep via NGT. IR notified and will be on standby if poor views/ inability to manage bleed on colonoscopy this AM.    Recommendations:  - Plan for colonoscopy this AM   - Resuscitate to keep hbg >7 and plts >50   - Please hold any further AC   - Please maintain active type and screen   - IR recs appreciated     Note incomplete     Pushpa Pritchard MD  Gastroenterology/Hepatology Fellow, PGY-4  Please contact via TEAMS    NON-URGENT CONSULTS:  Please email [email or web address] 82 y/o M with A-fib on Eliquis (last dose 8:30AM today), hypertension, hyperlipidemia, Davis's esophagus, BPH, presents with rectal bleeding for 1 day. Patient underwent colonoscopy with EMR of 5cm polyp in ascending colon with Dr. London at [facility] on 2/2. Resection and retrieval were complete. Ablation of visible vessel and margin with soft coag using hot biopsy forcep. Purastat (6 mL) was applied to prevent delayed bleeding. CTA positive for active arterial bleed at hepatic flexure. Patient brought to MICU for prep overnight and colonoscopy this AM.      #LGIB  - CTA with positive active a. bleed at hepatic flexure   - IR consulted, plan for colonoscopy first to manage bleed  - off AC now for 24 hours, s/p Kcentra     *Patient drank a little more than half of prep and vomited with likely aspiration given O2 and BP dropped. He has gotten 2U prbc and was on levo briefly throughout the night. He is continuing to have red liquid stool. In conversation with MICU team, they are concerned about recurrent vomiting and aspiration with additional prep via NGT. IR notified and will be on standby if poor views/ inability to manage bleed on colonoscopy this AM.    Recommendations:  - Plan for colonoscopy this AM   - Resuscitate to keep hbg >7 and plts >50   - Please hold any further AC   - Please maintain active type and screen   - IR recs appreciated     Note incomplete until signed by attending     Pushpa Pritchard MD  Gastroenterology/Hepatology Fellow, PGY-4  Please contact via TEAMS    NON-URGENT CONSULTS:  Please email [email or web address]

## 2024-02-10 NOTE — PROGRESS NOTE ADULT - ASSESSMENT
The patient is an 82yo man history of A-fib on Eliquis, hypertension, hyperlipidemia, Davis's esophagus, BPH who presented wtih 1 day of rectal bleeding following resumption of eliquis s/p recent polyp removal. Admitted to MICU for close hemodynamic monitoring with scope planned for 2/10 in the AM.      == NEURO ==    #Baseline  Dementia, sometimes confused on assessment  AOx3  Tylenol PRN for pain  No sedation    == CARDIOVASCULAR ==    #Baseline  RRR, no MRG    #Paroxysmal A Fib  - Hold home eliquis i/s/o clinically significant GI bleed  - Holding home metoprolol i/s/o clinically significant GI bleed    #HTN  - Hold home antihypertensives i/s/o active bleed    #HLD  - Resume home atorvastatin post-scope    == RESPIRATORY ==    #Baseline  - Satting well on RA, no active issues at this time      == RENAL ==    #Baseline  - Serum BUN/Cr: 13/0.91  - Baseline BUN/Cr: 11/0.82  - Trend Cr   - Montior I&Os    == GI ==    #Active Arterial Bleed  - CTA AP demonstrated active GI bleed at hepatic flexure, correlates with previous polyp removal on 2/2  - GI following, to scope in AM (2/10)  - Administer Golytely prep  - NPO at midnight  - Hold home AC  - s/p K Centra administration  - Start Pantoprazole 40mg IV BID    - Baseline Hgb 13; admission Hgb 11.1  - Trend H/H q4h  - s/p 1 uPRBC in ED  - If patient doesn't respond to PRBC transfusion appropriately, transfuse FFP; after which proceed with 3:1:1 ratio of PRBC:Plt:FFP  - Transfusion goal of Hgb > 7, Plt > 50 (active bleed)       == ENDO ==    No active issues    == HEME/ONC ==    #Active GI Bleed  - Baseline Hgb 13; admission Hgb 11.1  - Trend H/H q4h  - s/p 1 uPRBC in ED  - If patient doesn't respond to PRBC transfusion appropriately, transfuse FFP; after which proceed with 3:1:1 ratio of PRBC:Plt:FFP  - Transfusion goal of Hgb > 7, Plt > 50 (active bleed)    == ID ==  - Stable WBC, afebrile  - observe off abx    LINES/PPX  - peripherals in tact  - DVT PPx: SCDs i/s/o active bleed  - GOC: Full code

## 2024-02-10 NOTE — CHART NOTE - NSCHARTNOTEFT_GEN_A_CORE
MAR Accept Note  Transfer to: MED  Accepting Attending Physician: Dr. Nichols  Assigned Room: 9N    Patient seen and examined.   Labs and data reviewed.   No findings precluding transfer of service.       HPI/MICU COURSE:   Please refer to MICU transfer note for full details. Briefly, this is an 82yo man history of A-fib on Eliquis, hypertension, hyperlipidemia, Davis's esophagus, BPH, and dementia who presented wtih 1 day of rectal bleeding. The patient had a prior colonoscopy on 2/2/24 with Dr. London at [facility] A polyp was identified in the ascending colon and was resected with ablation and Purastat applied. The patient tolerated the procedure well and returned home the same day. On 2/6, the patient resumed his eliquis; on 2/9 the patient began to experience repeated episodes of bloody diarrhea with a syncopal episode. EMS services were engaged and the patient was brought to the hospital.    CTA of the Abodmen/Pelvis demonstrated active arterial colonic hemorrhage localized the hepatic flexure which correlates to his previous polyp removal on 2/2. IR was consulted and deferred embolization for GI management. GI was consulted and recommended admission to MICU for close hemodynamic monitoring and Golytely prep with plans to scope the patient in the MICU. Patient received 1u PRBC in the ED and given K Centra then transferred to MICU.    Pt received 2 units of pRBCs and Kcentra. Pt was scoped by GI which showed diverticulosis and a solitary ulcer. 2 clips were placed. Hgb has improved 8.8 to 9.6. Pt ready to be downgraded to floors.       FOR FOLLOW-UP:  [ ] NPO for now  [ ] f/u CBC at 8pm, can advance diet to CLD this evening if hemoglobin remains stable   [ ] patient has Dementia, can order seroquel for agitation         Ash Blair MD  Internal Medicine, PGY-3

## 2024-02-10 NOTE — PROGRESS NOTE ADULT - SUBJECTIVE AND OBJECTIVE BOX
Ese Griffith   PGY-1      INTERVAL HPI/OVERNIGHT EVENTS: No acute overnight events.     SUBJECTIVE: Patient seen and examined at bedside.     CONSTITUTIONAL: No weakness, fevers or chills  EYES/ENT: No visual changes;  No vertigo or throat pain   NECK: No pain or stiffness  RESPIRATORY: No cough, wheezing, hemoptysis; No shortness of breath  CARDIOVASCULAR: No chest pain or palpitations  GASTROINTESTINAL: No abdominal or epigastric pain. No nausea, vomiting, or hematemesis; No diarrhea or constipation. No melena or hematochezia.  GENITOURINARY: No dysuria, frequency or hematuria  NEUROLOGICAL: No numbness or weakness  SKIN: No itching, rashes    OBJECTIVE:    VITAL SIGNS:  ICU Vital Signs Last 24 Hrs  T(C): 35.9 (10 Feb 2024 08:00), Max: 37.1 (10 Feb 2024 00:00)  T(F): 96.6 (10 Feb 2024 08:00), Max: 98.7 (10 Feb 2024 00:00)  HR: 59 (10 Feb 2024 10:14) (56 - 102)  BP: 100/48 (10 Feb 2024 10:14) (75/36 - 152/78)  BP(mean): 58 (10 Feb 2024 10:14) (45 - 95)  ABP: --  ABP(mean): --  RR: 14 (10 Feb 2024 10:14) (12 - 40)  SpO2: 98% (10 Feb 2024 10:14) (89% - 100%)    O2 Parameters below as of 10 Feb 2024 09:50      O2 Concentration (%): 40          02-09 @ 07:01  -  02-10 @ 07:00  --------------------------------------------------------  IN: 1052 mL / OUT: 1300 mL / NET: -248 mL    02-10 @ 07:01  -  02-10 @ 10:17  --------------------------------------------------------  IN: 0 mL / OUT: 200 mL / NET: -200 mL      CAPILLARY BLOOD GLUCOSE      POCT Blood Glucose.: 99 mg/dL (10 Feb 2024 06:11)      PHYSICAL EXAM:    General: NAD  HEENT: NC/AT; PERRL, clear conjunctiva  Neck: supple  Respiratory: CTA b/l  Cardiovascular: +S1/S2; RRR  Abdomen: soft, NT/ND; +BS x4  Extremities: WWP, 2+ peripheral pulses b/l; no LE edema  Skin: normal color and turgor; no rash  Neurological:    MEDICATIONS:  MEDICATIONS  (STANDING):  dexMEDEtomidine Infusion 0.5 MICROgram(s)/kG/Hr (10.4 mL/Hr) IV Continuous <Continuous>  norepinephrine Infusion 0.05 MICROgram(s)/kG/Min (7.8 mL/Hr) IV Continuous <Continuous>  ondansetron Injectable 4 milliGRAM(s) IV Push once  pantoprazole  Injectable 40 milliGRAM(s) IV Push daily    MEDICATIONS  (PRN):      ALLERGIES:  Allergies    strawberry (Stomach Upset)  penicillins (Unknown)    Intolerances        LABS:                        8.2    9.34  )-----------( 189      ( 10 Feb 2024 07:50 )             24.3     02-10    139  |  107  |  15  ----------------------------<  100<H>  4.3   |  23  |  0.80    Ca    8.0<L>      10 Feb 2024 01:19  Phos  2.6     02-10  Mg     2.00     02-10    TPro  5.1<L>  /  Alb  3.2<L>  /  TBili  1.6<H>  /  DBili  0.3  /  AST  19  /  ALT  13  /  AlkPhos  49  02-10    PT/INR - ( 10 Feb 2024 01:19 )   PT: 12.3 sec;   INR: 1.10 ratio         PTT - ( 09 Feb 2024 17:54 )  PTT:29.1 sec  Urinalysis Basic - ( 10 Feb 2024 01:19 )    Color: x / Appearance: x / SG: x / pH: x  Gluc: 100 mg/dL / Ketone: x  / Bili: x / Urobili: x   Blood: x / Protein: x / Nitrite: x   Leuk Esterase: x / RBC: x / WBC x   Sq Epi: x / Non Sq Epi: x / Bacteria: x        RADIOLOGY & ADDITIONAL TESTS: Reviewed.

## 2024-02-10 NOTE — CHART NOTE - NSCHARTNOTEFT_GEN_A_CORE
MICU Transfer Note    Transfer from: MICU    Transfer to: ( ) Medicine    ( ) Telemetry     ( ) RCU        ( ) Palliative         ( ) Stroke Unit          ( ) __________________    Accepting physician:      MICU COURSE: The patient is an 82yo man history of A-fib on Eliquis, hypertension, hyperlipidemia, Davis's esophagus, BPH, and dementia who presented wtih 1 day of rectal bleeding. The patient had a prior colonoscopy on 2/2/24 with Dr. London at [facility]. A polyp was identified in the ascending colon and was resected with ablation and Purastat applied. The patient tolerated the procedure well and returned home the same day. On 2/6, the patient resumed his eliquis; on 2/9 the patient began to experience repeated episodes of bloody diarrhea with a syncopal episode. EMS services were engaged and the patient was brought to the hospital.    ED Course:  T: Afebrile, HR: 70, BP: 150/100 (lying down), 90/60 (sitting), SpO2 98% RA    Patient denied CP, dizzinss, SOB, abdominal pain, nausea, or vomiting. Last dose of eliquis was the morning of 2/9 at 8:30AM. Gross red blood was present on digital rectal exam without observable external hemorrhoids. Hgb/Hct 11.1/34.1; Plt 277; PT 15.3; INR 1.37, aPTT 29.1; CMP wnl.    CTA of the Abodmen/Pelvis demonstrated active arterial colonic hemorrhage localized the hepatic flexure which correlates to his previous polyp removal on 2/2. IR was consulted and deferred embolization for GI management. GI was consulted and recommended admission to MICU for close hemodynamic monitoring and Golytely prep with plans to scope the patient in the MICU. Patient received 1u PRBC in the ED and given K Centra then transferred to MICU.    Pt received 2 units of pRBCs and Kcentra. Pt was scoped by GI which showed diverticulosis and a solitary ulcer. 2 clips were placed. Hgb has improved 8.8 to 9.6. Pt ready to be downgraded to floors.           ASSESSMENT & PLAN:   The patient is an 82yo man history of A-fib on Eliquis, hypertension, hyperlipidemia, Davis's esophagus, BPH who presented wtih 1 day of rectal bleeding following resumption of eliquis s/p recent polyp removal. Admitted to MICU for close hemodynamic monitoring with scope planned for 2/10 in the AM.      == NEURO ==    #Baseline  Dementia, sometimes confused on assessment  AOx3  Tylenol PRN for pain  No sedation    == CARDIOVASCULAR ==    #Baseline  RRR, no MRG    #Paroxysmal A Fib  - Hold home eliquis i/s/o clinically significant GI bleed  - Holding home metoprolol i/s/o clinically significant GI bleed    #HTN  - Hold home antihypertensives i/s/o active bleed    #HLD  - Resume home atorvastatin post-scope    == RESPIRATORY ==    #Baseline  - Satting well on RA, no active issues at this time      == RENAL ==    #Baseline  - Serum BUN/Cr: 13/0.91  - Baseline BUN/Cr: 11/0.82  - Trend Cr   - Montior I&Os    == GI ==    #Active Arterial Bleed  - CTA AP demonstrated active GI bleed at hepatic flexure, correlates with previous polyp removal on 2/2  - GI following, scope: Diverticulosis in the entire examined colon. A single (solitary) clean-based ulcer likely the site of EMR in the proximal transverse colon. Vissible vessel and pigmentded spot visualized. 2 clips were placed.  - NPO  - Hold home AC  - s/p K Centra administration and pRBC  - Start Pantoprazole 40mg IV BID    - Baseline Hgb 13; admission Hgb 11.1  - Trend H/H q4h  - s/p 1 uPRBC in ED  - If patient doesn't respond to PRBC transfusion appropriately, transfuse FFP; after which proceed with 3:1:1 ratio of PRBC:Plt:FFP  - Transfusion goal of Hgb > 7, Plt > 50 (active bleed)       == ENDO ==    No active issues    == HEME/ONC ==    #Active GI Bleed  - Baseline Hgb 13; admission Hgb 11.1  - Trend H/H q4h  - s/p 1 uPRBC in ED  - If patient doesn't respond to PRBC transfusion appropriately, transfuse FFP; after which proceed with 3:1:1 ratio of PRBC:Plt:FFP  - Transfusion goal of Hgb > 7, Plt > 50 (active bleed)    == ID ==  - Stable WBC, afebrile  - observe off abx    LINES/PPX  - peripherals in tact  - DVT PPx: SCDs i/s/o active bleed  - GOC: Full code        For Followup:   - NPO today. Can advance diet to [facility] this evening if patient continues to have stable hbg. Continue to hold AC.          Vitals  T(F): 96.3 (02-10-24 @ 12:00), Max: 98.7 (02-10-24 @ 00:00)  HR: 62 (02-10-24 @ 14:00) (56 - 102)  BP: 111/53 (02-10-24 @ 14:00) (75/36 - 152/78)  BP(mean): 68 (02-10-24 @ 14:00) (45 - 95)  ABP: --  ABP(mean): --  RR: 13 (02-10-24 @ 14:00) (12 - 40)  SpO2: 92% (02-10-24 @ 14:00) (89% - 100%)  I/O Summary 24H    IN: 1052 mL / OUT: 1300 mL / NET: -248 mL        MEDICATIONS  (STANDING):  dexMEDEtomidine Infusion 0.5 MICROgram(s)/kG/Hr (10.4 mL/Hr) IV Continuous <Continuous>  norepinephrine Infusion 0.05 MICROgram(s)/kG/Min (7.8 mL/Hr) IV Continuous <Continuous>  pantoprazole  Injectable 40 milliGRAM(s) IV Push daily    MEDICATIONS  (PRN):        LABS  CBC 02-10-24 @ 14:05                        9.6    12.22 )-----------( 208                   29.1       Hgb trend: 9.6 <-- , 8.2 <-- , 8.8 <-- , 11.1 <--   WBC trend: 12.22 <-- , 9.34 <-- , 9.82 <-- , 16.15 <--     CMP 02-10-24 @ 01:19    139  |  107  |  15  ----------------------------<  100<H>  4.3   |  23  |  0.80    Ca    8.0<L>      02-10-24 @ 01:19  Phos  2.6     02-10  Mg     2.00     02-10    TPro  5.1<L>  /  Alb  3.2<L>  /  TBili  1.6<H>  /  DBili  0.3  /  AST  19  /  ALT  13  /  AlkPhos  49  02-10      Serum Cr trend: 0.80 <-- , 0.91 <--   PT/INR - ( 10 Feb 2024 01:19 )   PT: 12.3 sec;   INR: 1.10 ratio         PTT - ( 09 Feb 2024 17:54 ):29.1 sec MICU Transfer Note    Transfer from: MICU    Transfer to: (x ) Medicine    ( ) Telemetry     ( ) RCU        ( ) Palliative         ( ) Stroke Unit          ( ) __________________    Accepting physician: Dr. Matty Nichols       MICU COURSE: The patient is an 84yo man history of A-fib on Eliquis, hypertension, hyperlipidemia, Davis's esophagus, BPH, and dementia who presented wtih 1 day of rectal bleeding. The patient had a prior colonoscopy on 2/2/24 with Dr. London at [facility]. A polyp was identified in the ascending colon and was resected with ablation and Purastat applied. The patient tolerated the procedure well and returned home the same day. On 2/6, the patient resumed his eliquis; on 2/9 the patient began to experience repeated episodes of bloody diarrhea with a syncopal episode. EMS services were engaged and the patient was brought to the hospital.    ED Course:  T: Afebrile, HR: 70, BP: 150/100 (lying down), 90/60 (sitting), SpO2 98% RA    Patient denied CP, dizzinss, SOB, abdominal pain, nausea, or vomiting. Last dose of eliquis was the morning of 2/9 at 8:30AM. Gross red blood was present on digital rectal exam without observable external hemorrhoids. Hgb/Hct 11.1/34.1; Plt 277; PT 15.3; INR 1.37, aPTT 29.1; CMP wnl.    CTA of the Abodmen/Pelvis demonstrated active arterial colonic hemorrhage localized the hepatic flexure which correlates to his previous polyp removal on 2/2. IR was consulted and deferred embolization for GI management. GI was consulted and recommended admission to MICU for close hemodynamic monitoring and Golytely prep with plans to scope the patient in the MICU. Patient received 1u PRBC in the ED and given K Centra then transferred to MICU.    Pt received 2 units of pRBCs and Kcentra. Pt was scoped by GI which showed diverticulosis and a solitary ulcer. 2 clips were placed. Hgb has improved 8.8 to 9.6. Pt ready to be downgraded to floors.           ASSESSMENT & PLAN:   The patient is an 84yo man history of A-fib on Eliquis, hypertension, hyperlipidemia, Davis's esophagus, BPH who presented wtih 1 day of rectal bleeding following resumption of eliquis s/p recent polyp removal. Admitted to MICU for close hemodynamic monitoring with scope planned for 2/10 in the AM.      == NEURO ==    #Baseline  Dementia, sometimes confused on assessment  AOx3  Tylenol PRN for pain  No sedation    == CARDIOVASCULAR ==    #Baseline  RRR, no MRG    #Paroxysmal A Fib  - Hold home eliquis i/s/o clinically significant GI bleed  - Holding home metoprolol i/s/o clinically significant GI bleed    #HTN  - Hold home antihypertensives i/s/o active bleed    #HLD  - Resume home atorvastatin post-scope    == RESPIRATORY ==    #Baseline  - Satting well on RA, no active issues at this time      == RENAL ==    #Baseline  - Serum BUN/Cr: 13/0.91  - Baseline BUN/Cr: 11/0.82  - Trend Cr   - Montior I&Os    == GI ==    #Active Arterial Bleed  - CTA AP demonstrated active GI bleed at hepatic flexure, correlates with previous polyp removal on 2/2  - GI following, scope: Diverticulosis in the entire examined colon. A single (solitary) clean-based ulcer likely the site of EMR in the proximal transverse colon. Vissible vessel and pigmentded spot visualized. 2 clips were placed.  - NPO  - Hold home AC  - s/p K Centra administration and pRBC  - Start Pantoprazole 40mg IV BID    - Baseline Hgb 13; admission Hgb 11.1  - Trend H/H q4h  - s/p 1 uPRBC in ED  - If patient doesn't respond to PRBC transfusion appropriately, transfuse FFP; after which proceed with 3:1:1 ratio of PRBC:Plt:FFP  - Transfusion goal of Hgb > 7, Plt > 50 (active bleed)       == ENDO ==    No active issues    == HEME/ONC ==    #Active GI Bleed  - Baseline Hgb 13; admission Hgb 11.1  - Trend H/H q4h  - s/p 1 uPRBC in ED  - If patient doesn't respond to PRBC transfusion appropriately, transfuse FFP; after which proceed with 3:1:1 ratio of PRBC:Plt:FFP  - Transfusion goal of Hgb > 7, Plt > 50 (active bleed)    == ID ==  - Stable WBC, afebrile  - observe off abx    LINES/PPX  - peripherals in tact  - DVT PPx: SCDs i/s/o active bleed  - GOC: Full code        For Followup:  [ ] NPO for now  [ ] f/u CBC at   [ ] Can advance diet to [facility] this evening if patient continues to have stable hemoglobin           Vitals  T(F): 96.3 (02-10-24 @ 12:00), Max: 98.7 (02-10-24 @ 00:00)  HR: 62 (02-10-24 @ 14:00) (56 - 102)  BP: 111/53 (02-10-24 @ 14:00) (75/36 - 152/78)  BP(mean): 68 (02-10-24 @ 14:00) (45 - 95)  ABP: --  ABP(mean): --  RR: 13 (02-10-24 @ 14:00) (12 - 40)  SpO2: 92% (02-10-24 @ 14:00) (89% - 100%)  I/O Summary 24H    IN: 1052 mL / OUT: 1300 mL / NET: -248 mL        MEDICATIONS  (STANDING):  dexMEDEtomidine Infusion 0.5 MICROgram(s)/kG/Hr (10.4 mL/Hr) IV Continuous <Continuous>  norepinephrine Infusion 0.05 MICROgram(s)/kG/Min (7.8 mL/Hr) IV Continuous <Continuous>  pantoprazole  Injectable 40 milliGRAM(s) IV Push daily    MEDICATIONS  (PRN):        LABS  CBC 02-10-24 @ 14:05                        9.6    12.22 )-----------( 208                   29.1       Hgb trend: 9.6 <-- , 8.2 <-- , 8.8 <-- , 11.1 <--   WBC trend: 12.22 <-- , 9.34 <-- , 9.82 <-- , 16.15 <--     CMP 02-10-24 @ 01:19    139  |  107  |  15  ----------------------------<  100<H>  4.3   |  23  |  0.80    Ca    8.0<L>      02-10-24 @ 01:19  Phos  2.6     02-10  Mg     2.00     02-10    TPro  5.1<L>  /  Alb  3.2<L>  /  TBili  1.6<H>  /  DBili  0.3  /  AST  19  /  ALT  13  /  AlkPhos  49  02-10      Serum Cr trend: 0.80 <-- , 0.91 <--   PT/INR - ( 10 Feb 2024 01:19 )   PT: 12.3 sec;   INR: 1.10 ratio         PTT - ( 09 Feb 2024 17:54 ):29.1 sec MICU Transfer Note    Transfer from: MICU    Transfer to: (x ) Medicine    ( ) Telemetry     ( ) RCU        ( ) Palliative         ( ) Stroke Unit          ( ) __________________    Accepting physician: Dr. Matty Nichols       MICU COURSE: The patient is an 84yo man history of A-fib on Eliquis, hypertension, hyperlipidemia, Davis's esophagus, BPH, and dementia who presented wtih 1 day of rectal bleeding. The patient had a prior colonoscopy on 2/2/24 with Dr. London at [facility]. A polyp was identified in the ascending colon and was resected with ablation and Purastat applied. The patient tolerated the procedure well and returned home the same day. On 2/6, the patient resumed his eliquis; on 2/9 the patient began to experience repeated episodes of bloody diarrhea with a syncopal episode. EMS services were engaged and the patient was brought to the hospital.    ED Course:  T: Afebrile, HR: 70, BP: 150/100 (lying down), 90/60 (sitting), SpO2 98% RA    Patient denied CP, dizzinss, SOB, abdominal pain, nausea, or vomiting. Last dose of eliquis was the morning of 2/9 at 8:30AM. Gross red blood was present on digital rectal exam without observable external hemorrhoids. Hgb/Hct 11.1/34.1; Plt 277; PT 15.3; INR 1.37, aPTT 29.1; CMP wnl.    CTA of the Abodmen/Pelvis demonstrated active arterial colonic hemorrhage localized the hepatic flexure which correlates to his previous polyp removal on 2/2. IR was consulted and deferred embolization for GI management. GI was consulted and recommended admission to MICU for close hemodynamic monitoring and Golytely prep with plans to scope the patient in the MICU. Patient received 1u PRBC in the ED and given K Centra then transferred to MICU.    Pt received 2 units of pRBCs and Kcentra. Pt was scoped by GI which showed diverticulosis and a solitary ulcer. 2 clips were placed. Hgb has improved 8.8 to 9.6. Pt ready to be downgraded to floors.           ASSESSMENT & PLAN:   The patient is an 84yo man history of A-fib on Eliquis, hypertension, hyperlipidemia, Davis's esophagus, BPH who presented wtih 1 day of rectal bleeding following resumption of eliquis s/p recent polyp removal. Admitted to MICU for close hemodynamic monitoring with scope planned for 2/10 in the AM.      == NEURO ==    #Baseline  Dementia, sometimes confused on assessment  AOx3  Tylenol PRN for pain  No sedation    == CARDIOVASCULAR ==    #Baseline  RRR, no MRG    #Paroxysmal A Fib  - Hold home eliquis i/s/o clinically significant GI bleed  - Holding home metoprolol i/s/o clinically significant GI bleed    #HTN  - Hold home antihypertensives i/s/o active bleed    #HLD  - Resume home atorvastatin post-scope    == RESPIRATORY ==    #Baseline  - Satting well on RA, no active issues at this time      == RENAL ==    #Baseline  - Serum BUN/Cr: 13/0.91  - Baseline BUN/Cr: 11/0.82  - Trend Cr   - Montior I&Os    == GI ==    #Active Arterial Bleed  - CTA AP demonstrated active GI bleed at hepatic flexure, correlates with previous polyp removal on 2/2  - GI following, scope: Diverticulosis in the entire examined colon. A single (solitary) clean-based ulcer likely the site of EMR in the proximal transverse colon. Vissible vessel and pigmentded spot visualized. 2 clips were placed.  - NPO  - Hold home AC  - s/p K Centra administration and pRBC  - Start Pantoprazole 40mg IV BID    - Baseline Hgb 13; admission Hgb 11.1  - Trend H/H q4h  - s/p 1 uPRBC in ED  - If patient doesn't respond to PRBC transfusion appropriately, transfuse FFP; after which proceed with 3:1:1 ratio of PRBC:Plt:FFP  - Transfusion goal of Hgb > 7, Plt > 50 (active bleed)       == ENDO ==    No active issues    == HEME/ONC ==    #Active GI Bleed  - Baseline Hgb 13; admission Hgb 11.1  - Trend H/H q4h  - s/p 1 uPRBC in ED  - If patient doesn't respond to PRBC transfusion appropriately, transfuse FFP; after which proceed with 3:1:1 ratio of PRBC:Plt:FFP  - Transfusion goal of Hgb > 7, Plt > 50 (active bleed)    == ID ==  - Stable WBC, afebrile  - observe off abx    LINES/PPX  - peripherals in tact  - DVT PPx: SCDs i/s/o active bleed  - GOC: Full code        For Followup:  [ ] NPO for now  [ ] f/u CBC at 8pm, can advance diet to [facility] this evening if hemoglobin remains stable         Vitals  T(F): 96.3 (02-10-24 @ 12:00), Max: 98.7 (02-10-24 @ 00:00)  HR: 62 (02-10-24 @ 14:00) (56 - 102)  BP: 111/53 (02-10-24 @ 14:00) (75/36 - 152/78)  BP(mean): 68 (02-10-24 @ 14:00) (45 - 95)  ABP: --  ABP(mean): --  RR: 13 (02-10-24 @ 14:00) (12 - 40)  SpO2: 92% (02-10-24 @ 14:00) (89% - 100%)  I/O Summary 24H    IN: 1052 mL / OUT: 1300 mL / NET: -248 mL        MEDICATIONS  (STANDING):  dexMEDEtomidine Infusion 0.5 MICROgram(s)/kG/Hr (10.4 mL/Hr) IV Continuous <Continuous>  norepinephrine Infusion 0.05 MICROgram(s)/kG/Min (7.8 mL/Hr) IV Continuous <Continuous>  pantoprazole  Injectable 40 milliGRAM(s) IV Push daily    MEDICATIONS  (PRN):        LABS  CBC 02-10-24 @ 14:05                        9.6    12.22 )-----------( 208                   29.1       Hgb trend: 9.6 <-- , 8.2 <-- , 8.8 <-- , 11.1 <--   WBC trend: 12.22 <-- , 9.34 <-- , 9.82 <-- , 16.15 <--     CMP 02-10-24 @ 01:19    139  |  107  |  15  ----------------------------<  100<H>  4.3   |  23  |  0.80    Ca    8.0<L>      02-10-24 @ 01:19  Phos  2.6     02-10  Mg     2.00     02-10    TPro  5.1<L>  /  Alb  3.2<L>  /  TBili  1.6<H>  /  DBili  0.3  /  AST  19  /  ALT  13  /  AlkPhos  49  02-10      Serum Cr trend: 0.80 <-- , 0.91 <--   PT/INR - ( 10 Feb 2024 01:19 )   PT: 12.3 sec;   INR: 1.10 ratio         PTT - ( 09 Feb 2024 17:54 ):29.1 sec MICU Transfer Note    Transfer from: MICU    Transfer to: (x ) Medicine    ( ) Telemetry     ( ) RCU        ( ) Palliative         ( ) Stroke Unit          ( ) __________________    Accepting physician: Dr. Matty Nichols       MICU COURSE: The patient is an 82yo man history of A-fib on Eliquis, hypertension, hyperlipidemia, Davis's esophagus, BPH, and dementia who presented wtih 1 day of rectal bleeding. The patient had a prior colonoscopy on 2/2/24 with Dr. London at [facility]. A polyp was identified in the ascending colon and was resected with ablation and Purastat applied. The patient tolerated the procedure well and returned home the same day. On 2/6, the patient resumed his eliquis; on 2/9 the patient began to experience repeated episodes of bloody diarrhea with a syncopal episode. EMS services were engaged and the patient was brought to the hospital.    ED Course:  T: Afebrile, HR: 70, BP: 150/100 (lying down), 90/60 (sitting), SpO2 98% RA    Patient denied CP, dizzinss, SOB, abdominal pain, nausea, or vomiting. Last dose of eliquis was the morning of 2/9 at 8:30AM. Gross red blood was present on digital rectal exam without observable external hemorrhoids. Hgb/Hct 11.1/34.1; Plt 277; PT 15.3; INR 1.37, aPTT 29.1; CMP wnl.    CTA of the Abodmen/Pelvis demonstrated active arterial colonic hemorrhage localized the hepatic flexure which correlates to his previous polyp removal on 2/2. IR was consulted and deferred embolization for GI management. GI was consulted and recommended admission to MICU for close hemodynamic monitoring and Golytely prep with plans to scope the patient in the MICU. Patient received 1u PRBC in the ED and given K Centra then transferred to MICU.    Pt received 2 units of pRBCs and Kcentra. Pt was scoped by GI which showed diverticulosis and a solitary ulcer. 2 clips were placed. Hgb has improved 8.8 to 9.6. Pt ready to be downgraded to floors.           ASSESSMENT & PLAN:   The patient is an 82yo man history of A-fib on Eliquis, hypertension, hyperlipidemia, Davis's esophagus, BPH who presented wtih 1 day of rectal bleeding following resumption of eliquis s/p recent polyp removal. Admitted to MICU for close hemodynamic monitoring with scope planned for 2/10 in the AM.      == NEURO ==    #Baseline  Dementia, sometimes confused on assessment  AOx3  Tylenol PRN for pain  No sedation    == CARDIOVASCULAR ==    #Baseline  RRR, no MRG    #Paroxysmal A Fib  - Hold home eliquis i/s/o clinically significant GI bleed  - Holding home metoprolol i/s/o clinically significant GI bleed    #HTN  - Hold home antihypertensives i/s/o active bleed    #HLD  - Resume home atorvastatin post-scope    == RESPIRATORY ==    #Baseline  - Satting well on RA, no active issues at this time      == RENAL ==    #Baseline  - Serum BUN/Cr: 13/0.91  - Baseline BUN/Cr: 11/0.82  - Trend Cr   - Montior I&Os    == GI ==    #Active Arterial Bleed  - CTA AP demonstrated active GI bleed at hepatic flexure, correlates with previous polyp removal on 2/2  - GI following, scope: Diverticulosis in the entire examined colon. A single (solitary) clean-based ulcer likely the site of EMR in the proximal transverse colon. Vissible vessel and pigmentded spot visualized. 2 clips were placed.  - NPO  - Hold home AC  - s/p K Centra administration and pRBC  - Start Pantoprazole 40mg IV BID    - Baseline Hgb 13; admission Hgb 11.1  - Trend H/H q4h  - s/p 1 uPRBC in ED  - If patient doesn't respond to PRBC transfusion appropriately, transfuse FFP; after which proceed with 3:1:1 ratio of PRBC:Plt:FFP  - Transfusion goal of Hgb > 7, Plt > 50 (active bleed)       == ENDO ==    No active issues    == HEME/ONC ==    #Active GI Bleed  - Baseline Hgb 13; admission Hgb 11.1  - Trend H/H q4h  - s/p 1 uPRBC in ED  - If patient doesn't respond to PRBC transfusion appropriately, transfuse FFP; after which proceed with 3:1:1 ratio of PRBC:Plt:FFP  - Transfusion goal of Hgb > 7, Plt > 50 (active bleed)    == ID ==  - Stable WBC, afebrile  - observe off abx    LINES/PPX  - peripherals in tact  - DVT PPx: SCDs i/s/o active bleed  - GOC: Full code        For Followup:  [ ] NPO for now  [ ] f/u CBC at 8pm, can advance diet to [facility] this evening if hemoglobin remains stable   [ ] patient has Dementia, can order seroquel for agitation         Vitals  T(F): 96.3 (02-10-24 @ 12:00), Max: 98.7 (02-10-24 @ 00:00)  HR: 62 (02-10-24 @ 14:00) (56 - 102)  BP: 111/53 (02-10-24 @ 14:00) (75/36 - 152/78)  BP(mean): 68 (02-10-24 @ 14:00) (45 - 95)  ABP: --  ABP(mean): --  RR: 13 (02-10-24 @ 14:00) (12 - 40)  SpO2: 92% (02-10-24 @ 14:00) (89% - 100%)  I/O Summary 24H    IN: 1052 mL / OUT: 1300 mL / NET: -248 mL        MEDICATIONS  (STANDING):  dexMEDEtomidine Infusion 0.5 MICROgram(s)/kG/Hr (10.4 mL/Hr) IV Continuous <Continuous>  norepinephrine Infusion 0.05 MICROgram(s)/kG/Min (7.8 mL/Hr) IV Continuous <Continuous>  pantoprazole  Injectable 40 milliGRAM(s) IV Push daily    MEDICATIONS  (PRN):        LABS  CBC 02-10-24 @ 14:05                        9.6    12.22 )-----------( 208                   29.1       Hgb trend: 9.6 <-- , 8.2 <-- , 8.8 <-- , 11.1 <--   WBC trend: 12.22 <-- , 9.34 <-- , 9.82 <-- , 16.15 <--     CMP 02-10-24 @ 01:19    139  |  107  |  15  ----------------------------<  100<H>  4.3   |  23  |  0.80    Ca    8.0<L>      02-10-24 @ 01:19  Phos  2.6     02-10  Mg     2.00     02-10    TPro  5.1<L>  /  Alb  3.2<L>  /  TBili  1.6<H>  /  DBili  0.3  /  AST  19  /  ALT  13  /  AlkPhos  49  02-10      Serum Cr trend: 0.80 <-- , 0.91 <--   PT/INR - ( 10 Feb 2024 01:19 )   PT: 12.3 sec;   INR: 1.10 ratio         PTT - ( 09 Feb 2024 17:54 ):29.1 sec

## 2024-02-10 NOTE — CHART NOTE - NSCHARTNOTEFT_GEN_A_CORE
IR consulted yesterday regarding lower GI bleed with positive CT.     Patient transfused overnight and colonoscopy performed this morning. Per colonoscopy report, no active bleeding noted in the colon. Clips placed at the prior site of bleeding at hepatic flexure.     No role for angiography at this time.   Continue conservative management.     IR will sign off.     Please reconsult as needed.

## 2024-02-11 DIAGNOSIS — K92.2 GASTROINTESTINAL HEMORRHAGE, UNSPECIFIED: ICD-10-CM

## 2024-02-11 DIAGNOSIS — Z29.9 ENCOUNTER FOR PROPHYLACTIC MEASURES, UNSPECIFIED: ICD-10-CM

## 2024-02-11 DIAGNOSIS — I48.20 CHRONIC ATRIAL FIBRILLATION, UNSPECIFIED: ICD-10-CM

## 2024-02-11 DIAGNOSIS — F03.90 UNSPECIFIED DEMENTIA, UNSPECIFIED SEVERITY, WITHOUT BEHAVIORAL DISTURBANCE, PSYCHOTIC DISTURBANCE, MOOD DISTURBANCE, AND ANXIETY: ICD-10-CM

## 2024-02-11 DIAGNOSIS — E78.5 HYPERLIPIDEMIA, UNSPECIFIED: ICD-10-CM

## 2024-02-11 DIAGNOSIS — I10 ESSENTIAL (PRIMARY) HYPERTENSION: ICD-10-CM

## 2024-02-11 LAB
ALBUMIN SERPL ELPH-MCNC: 3.1 G/DL — LOW (ref 3.3–5)
ALP SERPL-CCNC: 57 U/L — SIGNIFICANT CHANGE UP (ref 40–120)
ALT FLD-CCNC: 16 U/L — SIGNIFICANT CHANGE UP (ref 4–41)
ANION GAP SERPL CALC-SCNC: 13 MMOL/L — SIGNIFICANT CHANGE UP (ref 7–14)
APTT BLD: 26.1 SEC — SIGNIFICANT CHANGE UP (ref 24.5–35.6)
AST SERPL-CCNC: 30 U/L — SIGNIFICANT CHANGE UP (ref 4–40)
BILIRUB SERPL-MCNC: 0.5 MG/DL — SIGNIFICANT CHANGE UP (ref 0.2–1.2)
BUN SERPL-MCNC: 9 MG/DL — SIGNIFICANT CHANGE UP (ref 7–23)
CALCIUM SERPL-MCNC: 8.2 MG/DL — LOW (ref 8.4–10.5)
CHLORIDE SERPL-SCNC: 107 MMOL/L — SIGNIFICANT CHANGE UP (ref 98–107)
CO2 SERPL-SCNC: 22 MMOL/L — SIGNIFICANT CHANGE UP (ref 22–31)
CREAT SERPL-MCNC: 0.82 MG/DL — SIGNIFICANT CHANGE UP (ref 0.5–1.3)
EGFR: 87 ML/MIN/1.73M2 — SIGNIFICANT CHANGE UP
GLUCOSE SERPL-MCNC: 74 MG/DL — SIGNIFICANT CHANGE UP (ref 70–99)
HCT VFR BLD CALC: 26.3 % — LOW (ref 39–50)
HGB BLD-MCNC: 8.8 G/DL — LOW (ref 13–17)
INR BLD: 1.11 RATIO — SIGNIFICANT CHANGE UP (ref 0.85–1.18)
MAGNESIUM SERPL-MCNC: 2 MG/DL — SIGNIFICANT CHANGE UP (ref 1.6–2.6)
MCHC RBC-ENTMCNC: 29.4 PG — SIGNIFICANT CHANGE UP (ref 27–34)
MCHC RBC-ENTMCNC: 33.5 GM/DL — SIGNIFICANT CHANGE UP (ref 32–36)
MCV RBC AUTO: 88 FL — SIGNIFICANT CHANGE UP (ref 80–100)
NRBC # BLD: 0 /100 WBCS — SIGNIFICANT CHANGE UP (ref 0–0)
NRBC # FLD: 0 K/UL — SIGNIFICANT CHANGE UP (ref 0–0)
PHOSPHATE SERPL-MCNC: 3 MG/DL — SIGNIFICANT CHANGE UP (ref 2.5–4.5)
PLATELET # BLD AUTO: 207 K/UL — SIGNIFICANT CHANGE UP (ref 150–400)
POTASSIUM SERPL-MCNC: 4.4 MMOL/L — SIGNIFICANT CHANGE UP (ref 3.5–5.3)
POTASSIUM SERPL-SCNC: 4.4 MMOL/L — SIGNIFICANT CHANGE UP (ref 3.5–5.3)
PROT SERPL-MCNC: 5.4 G/DL — LOW (ref 6–8.3)
PROTHROM AB SERPL-ACNC: 12.5 SEC — SIGNIFICANT CHANGE UP (ref 9.5–13)
RBC # BLD: 2.99 M/UL — LOW (ref 4.2–5.8)
RBC # FLD: 14.6 % — HIGH (ref 10.3–14.5)
SODIUM SERPL-SCNC: 142 MMOL/L — SIGNIFICANT CHANGE UP (ref 135–145)
WBC # BLD: 8.16 K/UL — SIGNIFICANT CHANGE UP (ref 3.8–10.5)
WBC # FLD AUTO: 8.16 K/UL — SIGNIFICANT CHANGE UP (ref 3.8–10.5)

## 2024-02-11 PROCEDURE — 99232 SBSQ HOSP IP/OBS MODERATE 35: CPT | Mod: GC

## 2024-02-11 RX ORDER — METOPROLOL TARTRATE 50 MG
25 TABLET ORAL DAILY
Refills: 0 | Status: DISCONTINUED | OUTPATIENT
Start: 2024-02-11 | End: 2024-02-11

## 2024-02-11 RX ORDER — METOPROLOL TARTRATE 50 MG
75 TABLET ORAL DAILY
Refills: 0 | Status: DISCONTINUED | OUTPATIENT
Start: 2024-02-11 | End: 2024-02-12

## 2024-02-11 RX ORDER — PANTOPRAZOLE SODIUM 20 MG/1
40 TABLET, DELAYED RELEASE ORAL
Refills: 0 | Status: DISCONTINUED | OUTPATIENT
Start: 2024-02-11 | End: 2024-02-12

## 2024-02-11 RX ADMIN — QUETIAPINE FUMARATE 12.5 MILLIGRAM(S): 200 TABLET, FILM COATED ORAL at 22:23

## 2024-02-11 RX ADMIN — PANTOPRAZOLE SODIUM 40 MILLIGRAM(S): 20 TABLET, DELAYED RELEASE ORAL at 17:36

## 2024-02-11 NOTE — DIETITIAN INITIAL EVALUATION ADULT - NUTRITIONGOAL OUTCOME1
As medically feasible, diet will be advanced so patient will meet >75% estimated energy requirements.

## 2024-02-11 NOTE — DIETITIAN INITIAL EVALUATION ADULT - ORAL INTAKE PTA/DIET HISTORY
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 No known food intolerances. Patient denies any chewing or swallowing difficulty with regular solids or thin liquids. Nutrition supplementation at home includes Multivitamin. Patient reports a good appetite at baseline, typically consumes three meals daily.     Patient reports usual body weight as 165lb, denies any recent weight changes  Per Heather CHOW, noted the following weight history: 83.2kg (2/9), 78kg (1/26), 82.4kg (11/6), 79.8kg (8/21)  Patient noted with fluctuating weight history, most recently 5.2kg (6%) weight gain x2 weeks period of time; accuracy?

## 2024-02-11 NOTE — DIETITIAN INITIAL EVALUATION ADULT - PERTINENT LABORATORY DATA
02-11    142  |  107  |  9   ----------------------------<  74  4.4   |  22  |  0.82    Ca    8.2<L>      11 Feb 2024 06:30  Phos  3.0     02-11  Mg     2.00     02-11    TPro  5.4<L>  /  Alb  3.1<L>  /  TBili  0.5  /  DBili  x   /  AST  30  /  ALT  16  /  AlkPhos  57  02-11

## 2024-02-11 NOTE — DIETITIAN INITIAL EVALUATION ADULT - OTHER INFO
Patient is currently on a clear liquids diet, advanced from NPO on 2/10. Patient reports tolerance to clear liquids thus far; no report of pain or GI distress (nausea, vomiting, diarrhea, constipation). Patient denies any chewing or swallowing difficulty with regular solids or thin liquids. Last BM 2/10/24 per RN flowsheet documentation. Not noted to be on a bowel regimen.    Writer provided verbal education regarding current diet order (clear liquids) and likely diet progression. Patient verbalized understanding to the discussion.

## 2024-02-11 NOTE — PROGRESS NOTE ADULT - PROBLEM SELECTOR PLAN 6
DVT ppx: SCDs iso recent GIB  Diet: CLD, advance as tolerated  Dispo: Home pending clinical course  Ethics: Full Code

## 2024-02-11 NOTE — PROGRESS NOTE ADULT - SUBJECTIVE AND OBJECTIVE BOX
Progress Note   Pushpa Pritchard- PGY4- GI/Hep    SUBJECTIVE: Patient seen and examined at bedside.   - doing well  - denies anymore red bloody stool  - no abd pain   - sid PO diet without n/v     OBJECTIVE:    VITAL SIGNS:  ICU Vital Signs Last 24 Hrs  T(C): 36.7 (11 Feb 2024 12:35), Max: 36.7 (11 Feb 2024 05:32)  T(F): 98.1 (11 Feb 2024 12:35), Max: 98.1 (11 Feb 2024 12:35)  HR: 74 (11 Feb 2024 12:35) (74 - 109)  BP: 134/66 (11 Feb 2024 12:35) (106/63 - 165/90)  BP(mean): 108 (10 Feb 2024 18:00) (72 - 108)  ABP: --  ABP(mean): --  RR: 18 (11 Feb 2024 12:35) (12 - 25)  SpO2: 99% (11 Feb 2024 12:35) (94% - 100%)    O2 Parameters below as of 11 Feb 2024 12:35  Patient On (Oxygen Delivery Method): room air        02-10 @ 07:01  -  02-11 @ 07:00  --------------------------------------------------------  IN: 0 mL / OUT: 1125 mL / NET: -1125 mL        PHYSICAL EXAM:    General: NAD  HEENT: NC/AT  Neck: supple  Respiratory: Regular RR, no increase in WOB  Cardiovascular: RRR  Abdomen: soft, NT/ND; +BS x4  Extremities: WWP, 2+ peripheral pulses b/l  Skin: normal color and turgor; no rash  Neurological: A&OX2    MEDICATIONS:  MEDICATIONS  (STANDING):  metoprolol succinate ER 25 milliGRAM(s) Oral daily  pantoprazole    Tablet 40 milliGRAM(s) Oral two times a day  QUEtiapine 12.5 milliGRAM(s) Oral at bedtime    MEDICATIONS  (PRN):      ALLERGIES:  Allergies    strawberry (Stomach Upset)  penicillins (Unknown)    Intolerances        LABS:                        8.8    8.16  )-----------( 207      ( 11 Feb 2024 10:27 )             26.3     02-11    142  |  107  |  9   ----------------------------<  74  4.4   |  22  |  0.82    Ca    8.2<L>      11 Feb 2024 06:30  Phos  3.0     02-11  Mg     2.00     02-11    TPro  5.4<L>  /  Alb  3.1<L>  /  TBili  0.5  /  DBili  x   /  AST  30  /  ALT  16  /  AlkPhos  57  02-11    PT/INR - ( 11 Feb 2024 10:27 )   PT: 12.5 sec;   INR: 1.11 ratio         PTT - ( 11 Feb 2024 10:27 )  PTT:26.1 sec  Urinalysis Basic - ( 11 Feb 2024 06:30 )    Color: x / Appearance: x / SG: x / pH: x  Gluc: 74 mg/dL / Ketone: x  / Bili: x / Urobili: x   Blood: x / Protein: x / Nitrite: x   Leuk Esterase: x / RBC: x / WBC x   Sq Epi: x / Non Sq Epi: x / Bacteria: x      < from: Colonoscopy (02.10.24 @ 08:32) >  Findings:       Scattered small-mouthed diverticula were found in the entire colon.       A single clean-based ulcerwas found in the proximal transverse colon.        No bleeding was present. Stigmata of recent bleeding (pigmentation and        visible vessel) were present. For location marking and vessel        eradication, two hemostatic clips were successfully placed. There was no        bleeding during or after the procedure.                                                                                   Impression:          - Preparation of the colon was fair.                       - Diverticulosis in the entire examined colon.                       - A single (solitary) clean-based ulcer likely the site                        of EMR in the proximal transverse colon. Vissible vessel                        and pigmentded spot visualized. 2 clips wereplaced.                       - No specimens collected.  Recommendation:      - Continue to monitor in MICU                       - NPO today.                       - Can advance diet to CLD this evening if patient                        continues tohave stable hbg.                       - Continue to hold AC.    < end of copied text >

## 2024-02-11 NOTE — PROGRESS NOTE ADULT - ASSESSMENT
84 y/o M with A-fib on Eliquis (last dose 8:30AM today), hypertension, hyperlipidemia, Davis's esophagus, BPH, presents with rectal bleeding for 1 day. Patient underwent colonoscopy with EMR of 5cm polyp in ascending colon with Dr. London at [facility] on 2/2. Resection and retrieval were complete. Ablation of visible vessel and margin with soft coag using hot biopsy forcep. Purastat (6 mL) was applied to prevent delayed bleeding. CTA positive for active arterial bleed at hepatic flexure. Patient brought to MICU for prep overnight and colonoscopy this AM.      #LGIB  - CTA with positive active a. bleed at hepatic flexure   - IR consulted, plan for colonoscopy first to manage bleed  - off AC now for 24 hours, s/p Kcentra   - S/p colonoscopy with visualization of post EMR site with clean based ulcer with pigmented spot and vessel - two clips placed; no further bleeding and hbg stable     Recommendations:  - hbg has been stable; patient sid PO diet   - would continue to hold AC if no contraindication   - trend hbg and keep >7    Note incomplete until signed by attending     Pushpa Pritchard MD  Gastroenterology/Hepatology Fellow, PGY-4  Please contact via TEAMS    NON-URGENT CONSULTS:  Please email [email or web address]

## 2024-02-11 NOTE — DIETITIAN INITIAL EVALUATION ADULT - ADD RECOMMEND
1. Monitor weights, labs, BM's, skin integrity, p.o. intake, diet progression  2. Please monitor % PO intake on flowsheets

## 2024-02-11 NOTE — DIETITIAN INITIAL EVALUATION ADULT - DIET TYPE
As medically feasible, recommend gradual PO diet progression as tolerated, at medical team's discretion

## 2024-02-11 NOTE — PROGRESS NOTE ADULT - SUBJECTIVE AND OBJECTIVE BOX
***************************************************************  Charley Mcrae, PGY 1   Internal Medicine   ***************************************************************    ULISES ARIZMENDI  83y  MRN: [id number]    Patient is a 83y old  Male who presents with a chief complaint of GI Bleed (10 Feb 2024 10:16)      Subjective: Code RUPA overnight for disoriented behavior, given Seroquel 300mg. Denies fever, CP, SOB, abn pain, N/V, dysuria. Tolerating diet.      MEDICATIONS  (STANDING):  pantoprazole  Injectable 40 milliGRAM(s) IV Push daily  QUEtiapine 12.5 milliGRAM(s) Oral at bedtime    MEDICATIONS  (PRN):      Objective:    Vitals: Vital Signs Last 24 Hrs  T(C): 36.7 (02-11-24 @ 05:32), Max: 36.7 (02-11-24 @ 05:32)  T(F): 98 (02-11-24 @ 05:32), Max: 98 (02-11-24 @ 05:32)  HR: 96 (02-11-24 @ 05:32) (56 - 109)  BP: 146/62 (02-11-24 @ 05:32) (86/44 - 165/90)  BP(mean): 108 (02-10-24 @ 18:00) (50 - 108)  RR: 17 (02-11-24 @ 05:32) (12 - 31)  SpO2: 100% (02-11-24 @ 05:32) (92% - 100%)            I&O's Summary    09 Feb 2024 07:01  -  10 Feb 2024 07:00  --------------------------------------------------------  IN: 1052 mL / OUT: 1300 mL / NET: -248 mL    10 Feb 2024 07:01  -  11 Feb 2024 06:46  --------------------------------------------------------  IN: 0 mL / OUT: 1125 mL / NET: -1125 mL        PHYSICAL EXAM:  GENERAL: NAD  HEAD:  Atraumatic, Normocephalic  EYES: EOMI, conjunctiva and sclera clear  CHEST/LUNG: Clear to auscultation bilaterally; No rales, rhonchi, wheezing, or rubs  HEART: Regular rate and rhythm; No murmurs, rubs, or gallops  ABDOMEN: Soft, Nontender, Nondistended;   SKIN: No rashes or lesions  NERVOUS SYSTEM:  Alert & Oriented X3, no focal deficits    LABS:  02-10    139  |  107  |  15  ----------------------------<  100<H>  4.3   |  23  |  0.80  02-09    140  |  107  |  13  ----------------------------<  114<H>  4.3   |  24  |  0.91    Ca    8.0<L>      10 Feb 2024 01:19  Ca    8.5      09 Feb 2024 17:54  Phos  2.6     02-10  Mg     2.00     02-10    TPro  5.1<L>  /  Alb  3.2<L>  /  TBili  1.6<H>  /  DBili  0.3  /  AST  19  /  ALT  13  /  AlkPhos  49  02-10  TPro  6.2  /  Alb  3.7  /  TBili  0.3  /  DBili  x   /  AST  24  /  ALT  18  /  AlkPhos  63  02-09      PT/INR - ( 10 Feb 2024 01:19 )   PT: 12.3 sec;   INR: 1.10 ratio         PTT - ( 09 Feb 2024 17:54 )  PTT:29.1 sec              Urinalysis Basic - ( 10 Feb 2024 01:19 )    Color: x / Appearance: x / SG: x / pH: x  Gluc: 100 mg/dL / Ketone: x  / Bili: x / Urobili: x   Blood: x / Protein: x / Nitrite: x   Leuk Esterase: x / RBC: x / WBC x   Sq Epi: x / Non Sq Epi: x / Bacteria: x                              9.5    12.53 )-----------( 215      ( 10 Feb 2024 19:00 )             28.6                         9.6    12.22 )-----------( 208      ( 10 Feb 2024 14:05 )             29.1                         8.2    9.34  )-----------( 189      ( 10 Feb 2024 07:50 )             24.3     CAPILLARY BLOOD GLUCOSE          RADIOLOGY & ADDITIONAL TESTS:    Imaging Personally Reviewed:  [x ] YES  [ ] NO    Consultants involved in case:   Consultant(s) Notes Reviewed:  [ x] YES  [ ] NO:   Care Discussed with Consultants/Other Providers [x ] YES  [ ] NO         ***************************************************************  Charley Mcrae, PGY 1   Internal Medicine   ***************************************************************    ULISES ARIZMENDI  83y  MRN: [id number]    Patient is a 83y old  Male who presents with a chief complaint of GI Bleed (10 Feb 2024 10:16)      Subjective: Code RUPA overnight for disoriented behavior, given Seroquel 300mg. Seen at bedside, pleasantly confused, oriented to name however believes he is at home. Tolerating CLD. Denies fever, CP, SOB, abn pain, N/V, dysuria.    MEDICATIONS  (STANDING):  pantoprazole  Injectable 40 milliGRAM(s) IV Push daily  QUEtiapine 12.5 milliGRAM(s) Oral at bedtime    MEDICATIONS  (PRN):      Objective:    Vitals: Vital Signs Last 24 Hrs  T(C): 36.7 (02-11-24 @ 05:32), Max: 36.7 (02-11-24 @ 05:32)  T(F): 98 (02-11-24 @ 05:32), Max: 98 (02-11-24 @ 05:32)  HR: 96 (02-11-24 @ 05:32) (56 - 109)  BP: 146/62 (02-11-24 @ 05:32) (86/44 - 165/90)  BP(mean): 108 (02-10-24 @ 18:00) (50 - 108)  RR: 17 (02-11-24 @ 05:32) (12 - 31)  SpO2: 100% (02-11-24 @ 05:32) (92% - 100%)            I&O's Summary    09 Feb 2024 07:01  -  10 Feb 2024 07:00  --------------------------------------------------------  IN: 1052 mL / OUT: 1300 mL / NET: -248 mL    10 Feb 2024 07:01  -  11 Feb 2024 06:46  --------------------------------------------------------  IN: 0 mL / OUT: 1125 mL / NET: -1125 mL        PHYSICAL EXAM:  GENERAL: NAD  HEAD:  Atraumatic, Normocephalic  EYES: EOMI, conjunctiva and sclera clear  CHEST/LUNG: Clear to auscultation bilaterally; No rales, rhonchi, wheezing, or rubs  HEART: Regular rate and rhythm; No murmurs, rubs, or gallops  ABDOMEN: Soft, Mild lower abdominal tenderness to deep palpation. Nondistended;  SKIN: No rashes or lesions. PIV x 2.   NERVOUS SYSTEM:  Alert & Oriented X1, no focal deficits    LABS:  02-10    139  |  107  |  15  ----------------------------<  100<H>  4.3   |  23  |  0.80  02-09    140  |  107  |  13  ----------------------------<  114<H>  4.3   |  24  |  0.91    Ca    8.0<L>      10 Feb 2024 01:19  Ca    8.5      09 Feb 2024 17:54  Phos  2.6     02-10  Mg     2.00     02-10    TPro  5.1<L>  /  Alb  3.2<L>  /  TBili  1.6<H>  /  DBili  0.3  /  AST  19  /  ALT  13  /  AlkPhos  49  02-10  TPro  6.2  /  Alb  3.7  /  TBili  0.3  /  DBili  x   /  AST  24  /  ALT  18  /  AlkPhos  63  02-09      PT/INR - ( 10 Feb 2024 01:19 )   PT: 12.3 sec;   INR: 1.10 ratio         PTT - ( 09 Feb 2024 17:54 )  PTT:29.1 sec              Urinalysis Basic - ( 10 Feb 2024 01:19 )    Color: x / Appearance: x / SG: x / pH: x  Gluc: 100 mg/dL / Ketone: x  / Bili: x / Urobili: x   Blood: x / Protein: x / Nitrite: x   Leuk Esterase: x / RBC: x / WBC x   Sq Epi: x / Non Sq Epi: x / Bacteria: x                              9.5    12.53 )-----------( 215      ( 10 Feb 2024 19:00 )             28.6                         9.6    12.22 )-----------( 208      ( 10 Feb 2024 14:05 )             29.1                         8.2    9.34  )-----------( 189      ( 10 Feb 2024 07:50 )             24.3     CAPILLARY BLOOD GLUCOSE          RADIOLOGY & ADDITIONAL TESTS:    Imaging Personally Reviewed:  [x ] YES  [ ] NO    Consultants involved in case:   Consultant(s) Notes Reviewed:  [ x] YES  [ ] NO:   Care Discussed with Consultants/Other Providers [x ] YES  [ ] NO

## 2024-02-11 NOTE — PROGRESS NOTE ADULT - PROBLEM SELECTOR PLAN 1
Active GIB at hepatic flexure correlating with prior polypectomy 2/2. Underwent EGD 2/10 showing . Diet advanced to CLD 2/10.   - Pantoprazole 40mg PO BID   - Baseline Hgb 13; admission Hgb 11.1, currently 9.5   - Trend H/H qd   - s/p 3 PRBCs in present admission   - Transfusion goal of Hgb > 7, Plt > 50   - Hold home AC  - s/p K Centra administration  - Start Pantoprazole 40mg IV BID Active GIB at hepatic flexure correlating with prior polypectomy 2/2. Required 3u PRBCs, K Centra, briefly in MICU for hemodynamic monitoring due to hypotension req pressor suppport, now stabilized for transfer to floors following colonoscopy w/endoscopic clip placement. Diet advanced to CLD 2/10.   - Pantoprazole 40mg PO BID   - Baseline Hgb 13; admission Hgb 11.1, currently 9.5   - Trend H/H qd   - Transfusion goal of Hgb > 7, Plt > 50   - Hold home AC  - C/w Pantoprazole 40mg PO BID

## 2024-02-11 NOTE — DIETITIAN INITIAL EVALUATION ADULT - ORAL NUTRITION SUPPLEMENTS
Consider Ensure Clear (provides 240kcal, 8gms protein per serving) once daily to support nutrition needs

## 2024-02-11 NOTE — DIETITIAN INITIAL EVALUATION ADULT - REASON FOR ADMISSION
Gastrointestinal hemorrhage    Patient is an 83y Male with PMH Afib, HTN, HLD, Davis's esophagus, BPH, and dementia who presented to [facility] with one day of rectal bleeding.

## 2024-02-11 NOTE — PROGRESS NOTE ADULT - ASSESSMENT
The patient is an 82yo man history of A-fib on Eliquis, hypertension, hyperlipidemia, Davis's esophagus, BPH who presented wtih 1 day of rectal bleeding following resumption of Eliquis s/p recent polyp removal. Admitted to MICU for close hemodynamic monitoring with scope planned for 2/10 in the AM.    #Baseline  - Serum BUN/Cr: 13/0.91  - Baseline BUN/Cr: 11/0.82  - Trend Cr   - Montior I&Os     The patient is an 82yo man history of A-fib on Eliquis, hypertension, hyperlipidemia, Davis's esophagus, BPH who presented wtih 1 day of rectal bleeding following resumption of Eliquis s/p recent polyp removal. Now s/p colonoscopy with endoscopic clipping of colonic ulcer transferred to floors for continued care.

## 2024-02-11 NOTE — DIETITIAN INITIAL EVALUATION ADULT - NAME AND PHONE
Problem: Impaired Activities of Daily Living  Goal: Achieve highest/safest level of independence in self care  Interventions:  - Assess ability and encourage patient to participate in ADLs to maximize function  - Promote sitting position while performing A Samanta Greene MS RDN CDN  On [employer] Teams, Pager #[phone]

## 2024-02-11 NOTE — DIETITIAN INITIAL EVALUATION ADULT - PERTINENT MEDS FT
MEDICATIONS  (STANDING):  pantoprazole    Tablet 40 milliGRAM(s) Oral two times a day  QUEtiapine 12.5 milliGRAM(s) Oral at bedtime    MEDICATIONS  (PRN):

## 2024-02-12 ENCOUNTER — TRANSCRIPTION ENCOUNTER (OUTPATIENT)
Age: 84
End: 2024-02-12

## 2024-02-12 VITALS
OXYGEN SATURATION: 100 % | TEMPERATURE: 98 F | DIASTOLIC BLOOD PRESSURE: 75 MMHG | RESPIRATION RATE: 17 BRPM | HEART RATE: 80 BPM | SYSTOLIC BLOOD PRESSURE: 129 MMHG

## 2024-02-12 LAB
ALBUMIN SERPL ELPH-MCNC: 3.2 G/DL — LOW (ref 3.3–5)
ALP SERPL-CCNC: 54 U/L — SIGNIFICANT CHANGE UP (ref 40–120)
ALT FLD-CCNC: 15 U/L — SIGNIFICANT CHANGE UP (ref 4–41)
ANION GAP SERPL CALC-SCNC: 10 MMOL/L — SIGNIFICANT CHANGE UP (ref 7–14)
AST SERPL-CCNC: 28 U/L — SIGNIFICANT CHANGE UP (ref 4–40)
BASOPHILS # BLD AUTO: 0.09 K/UL — SIGNIFICANT CHANGE UP (ref 0–0.2)
BASOPHILS NFR BLD AUTO: 1.1 % — SIGNIFICANT CHANGE UP (ref 0–2)
BILIRUB SERPL-MCNC: 0.4 MG/DL — SIGNIFICANT CHANGE UP (ref 0.2–1.2)
BUN SERPL-MCNC: 8 MG/DL — SIGNIFICANT CHANGE UP (ref 7–23)
CALCIUM SERPL-MCNC: 8.3 MG/DL — LOW (ref 8.4–10.5)
CHLORIDE SERPL-SCNC: 105 MMOL/L — SIGNIFICANT CHANGE UP (ref 98–107)
CO2 SERPL-SCNC: 26 MMOL/L — SIGNIFICANT CHANGE UP (ref 22–31)
CREAT SERPL-MCNC: 0.89 MG/DL — SIGNIFICANT CHANGE UP (ref 0.5–1.3)
EGFR: 85 ML/MIN/1.73M2 — SIGNIFICANT CHANGE UP
EOSINOPHIL # BLD AUTO: 0.52 K/UL — HIGH (ref 0–0.5)
EOSINOPHIL NFR BLD AUTO: 6.4 % — HIGH (ref 0–6)
GLUCOSE SERPL-MCNC: 83 MG/DL — SIGNIFICANT CHANGE UP (ref 70–99)
HCT VFR BLD CALC: 26.1 % — LOW (ref 39–50)
HCT VFR BLD CALC: 28.5 % — LOW (ref 39–50)
HGB BLD-MCNC: 8.6 G/DL — LOW (ref 13–17)
HGB BLD-MCNC: 9.3 G/DL — LOW (ref 13–17)
IANC: 4.23 K/UL — SIGNIFICANT CHANGE UP (ref 1.8–7.4)
IMM GRANULOCYTES NFR BLD AUTO: 0.5 % — SIGNIFICANT CHANGE UP (ref 0–0.9)
LYMPHOCYTES # BLD AUTO: 2.32 K/UL — SIGNIFICANT CHANGE UP (ref 1–3.3)
LYMPHOCYTES # BLD AUTO: 28.6 % — SIGNIFICANT CHANGE UP (ref 13–44)
MAGNESIUM SERPL-MCNC: 2 MG/DL — SIGNIFICANT CHANGE UP (ref 1.6–2.6)
MCHC RBC-ENTMCNC: 29.2 PG — SIGNIFICANT CHANGE UP (ref 27–34)
MCHC RBC-ENTMCNC: 29.5 PG — SIGNIFICANT CHANGE UP (ref 27–34)
MCHC RBC-ENTMCNC: 32.6 GM/DL — SIGNIFICANT CHANGE UP (ref 32–36)
MCHC RBC-ENTMCNC: 33 GM/DL — SIGNIFICANT CHANGE UP (ref 32–36)
MCV RBC AUTO: 89.4 FL — SIGNIFICANT CHANGE UP (ref 80–100)
MCV RBC AUTO: 89.6 FL — SIGNIFICANT CHANGE UP (ref 80–100)
MONOCYTES # BLD AUTO: 0.92 K/UL — HIGH (ref 0–0.9)
MONOCYTES NFR BLD AUTO: 11.3 % — SIGNIFICANT CHANGE UP (ref 2–14)
NEUTROPHILS # BLD AUTO: 4.23 K/UL — SIGNIFICANT CHANGE UP (ref 1.8–7.4)
NEUTROPHILS NFR BLD AUTO: 52.1 % — SIGNIFICANT CHANGE UP (ref 43–77)
NRBC # BLD: 0 /100 WBCS — SIGNIFICANT CHANGE UP (ref 0–0)
NRBC # BLD: 0 /100 WBCS — SIGNIFICANT CHANGE UP (ref 0–0)
NRBC # FLD: 0 K/UL — SIGNIFICANT CHANGE UP (ref 0–0)
NRBC # FLD: 0.02 K/UL — HIGH (ref 0–0)
PHOSPHATE SERPL-MCNC: 3.3 MG/DL — SIGNIFICANT CHANGE UP (ref 2.5–4.5)
PLATELET # BLD AUTO: 232 K/UL — SIGNIFICANT CHANGE UP (ref 150–400)
PLATELET # BLD AUTO: 252 K/UL — SIGNIFICANT CHANGE UP (ref 150–400)
POTASSIUM SERPL-MCNC: 3.7 MMOL/L — SIGNIFICANT CHANGE UP (ref 3.5–5.3)
POTASSIUM SERPL-SCNC: 3.7 MMOL/L — SIGNIFICANT CHANGE UP (ref 3.5–5.3)
PROT SERPL-MCNC: 5.3 G/DL — LOW (ref 6–8.3)
RBC # BLD: 2.92 M/UL — LOW (ref 4.2–5.8)
RBC # BLD: 3.18 M/UL — LOW (ref 4.2–5.8)
RBC # FLD: 14.6 % — HIGH (ref 10.3–14.5)
RBC # FLD: 14.6 % — HIGH (ref 10.3–14.5)
SODIUM SERPL-SCNC: 141 MMOL/L — SIGNIFICANT CHANGE UP (ref 135–145)
WBC # BLD: 7.8 K/UL — SIGNIFICANT CHANGE UP (ref 3.8–10.5)
WBC # BLD: 8.12 K/UL — SIGNIFICANT CHANGE UP (ref 3.8–10.5)
WBC # FLD AUTO: 7.8 K/UL — SIGNIFICANT CHANGE UP (ref 3.8–10.5)
WBC # FLD AUTO: 8.12 K/UL — SIGNIFICANT CHANGE UP (ref 3.8–10.5)

## 2024-02-12 PROCEDURE — 99239 HOSP IP/OBS DSCHRG MGMT >30: CPT

## 2024-02-12 PROCEDURE — 99232 SBSQ HOSP IP/OBS MODERATE 35: CPT | Mod: GC

## 2024-02-12 RX ORDER — METOPROLOL TARTRATE 50 MG
0.5 TABLET ORAL
Refills: 0 | DISCHARGE

## 2024-02-12 RX ORDER — METOPROLOL TARTRATE 50 MG
1 TABLET ORAL
Qty: 0 | Refills: 0 | DISCHARGE

## 2024-02-12 RX ADMIN — Medication 75 MILLIGRAM(S): at 05:16

## 2024-02-12 RX ADMIN — PANTOPRAZOLE SODIUM 40 MILLIGRAM(S): 20 TABLET, DELAYED RELEASE ORAL at 05:16

## 2024-02-12 NOTE — DISCHARGE NOTE PROVIDER - DISCHARGE SERVICE FOR PATIENT
Future Appointments   Date Time Provider Quinn Fuentesi   8/13/2019 10:00 AM Gene Haley  [facility]                         Last Appointment My Department:  4/20/18    Please advise of refill below. Requested Prescriptions     Pending Prescriptions Disp Refills    phenytoin (DILANTIN-125) suspension 1 Bottle 0     Sig: Take 4 mL by mouth two (2) times a day.
on the discharge service for the patient. I have reviewed and made amendments to the documentation where necessary.

## 2024-02-12 NOTE — PROGRESS NOTE ADULT - PROBLEM SELECTOR PLAN 3
Hold antihypertensives iso recent GIB. Takes MTPS 25mg ER qd
Hold antihypertensives iso recent GIB. Takes MTPS 25mg ER qd

## 2024-02-12 NOTE — PHYSICAL THERAPY INITIAL EVALUATION ADULT - GENERAL OBSERVATIONS, REHAB EVAL
Pt received seated at bedside in no apparent distress, alert but is pleasantly confused , HR 85 SpO2 100% .

## 2024-02-12 NOTE — PROGRESS NOTE ADULT - ATTENDING COMMENTS
Agree w above. No further GI bleeding note. Advance diet as tolerated.
82yo man history of A-fib on Eliquis, hypertension, hyperlipidemia, Davis's esophagus, BPH with recent colon biopsy who was admitted to MICU with GIB. CT scan with arterial bleeding, s/p PCC given eliquis use.     Is s/p 2 units of blood with stable CBC, not requiring pressors.     # Acute GIB  # AFib  # Anemia  # Hypotension  - GIB at the site of recent biopsy.   - Admitted to MICU for monitoring and need for urgent colonoscopy  - trend CBC and transfuse PRN  - Monitor hemodynamics, may need pressors  - s/p colonoscopy with no active bleeding.   - IF recurrent bleeding will need IR.   - DVT ppx -SCD  - dispo- full code.
83M Afib on eliquis p/w post-polypectomy bleed following piecemeal EMR of 5 cm lesion on 2/2, now s/p repeat colonoscopy with clipping of ulcer in area of EMR (2 MRI-conditional clips).    No further rectal bleeding, tolerating regular diet. Abd soft nontender, VSS, hgb stable over last 48h.    Stable from discharge for GI perspective. Defer resumption of AC to outpatient PCP vs Cards per primary team, would hold until 2/14.    GI will sign off, please reach out with questions/concerns/clinical change.
82yo man PMH paroxysmal A-fib on Eliquis, HTN, HLD, Davis's esophagus, BPH p/w rectal bleeding following resumption of Eliquis s/p recent polyp removal. Now s/p colonoscopy with endoscopic clipping of colonic ulcer and noted diverticulosis.    CODE RUPA overnight: Haldol 5mg IVP given and seroquel 12.5mg ordered for bedtime.    No further bleeding, H/H stable  Maintain off AC  Resume home Toprol for Afib  Advance diet  Frequent orientation for delirium/sundowning  f/u PT tara  Spoke to wife at bedside: does not need additional home services    DC planning likely home tmw

## 2024-02-12 NOTE — CHART NOTE - NSCHARTNOTEFT_GEN_A_CORE
Spoke with patient's wife Leena Figueroa in regard to patient's diagnosis and prognosis. Explained that patient had LGIB and eliquis needed to be held. I explained that the patient was on eliquis due to atrial fibrillation, an irregular heart rhythm that increases risk of stroke. I explained that restarting eliquis at this time would put that patient at risk for recurrent bleeding and holding it would increase the patient's risk for stroke in the setting of his AF. After extensive risk and benefit conversation, she agreed to hold patient's eliquis until they follow up in 1-2 weeks with the patient's primary care provider. Primary medicine team and inpatient GI team in agreement with this plan, will recheck CBC for stability and plan to d/c patient to home with close outpatient PCP follow up.

## 2024-02-12 NOTE — DISCHARGE NOTE PROVIDER - HOSPITAL COURSE
Mr. Figueroa is an 84yo man history of A-fib on Eliquis, hypertension, hyperlipidemia, Davis's esophagus, BPH, and dementia who presented wtih 1 day of rectal bleeding. The patient had a prior colonoscopy on 2/2/24 with Dr. London at [facility]. A polyp was identified in the ascending colon and was resected with ablation. The patient tolerated the procedure well and returned home the same day. On 2/6, the patient resumed his eliquis; on 2/9 the patient began to experience repeated episodes of bloody diarrhea with a syncopal episode. Admitted to [facility] on 2/9/24. CTA of the Abodmen/Pelvis was performed on admission, demonstrated active arterial colonic hemorrhage localized the hepatic flexure which correlates to his previous polyp removal on 2/2. IR was consulted and deferred embolization for GI management. GI was consulted and recommended admission to MICU for close hemodynamic monitoring, patient then underwent colonoscopy with endoscopic clipping of the ulcer identified at the hepatic flexure. Was briefly hypotensive requiring pressors for a day then was transferred to floors after pressures improved. On 2/10 diet was advanced to clears, and to regular diet on 2/11. Anticoagulation was held during his hospitalization. On 2/12 patient was deemed stable for discharge home with established followup. Patient and spouse expressed understanding of care plan.       Important Medication Changes and Reason:     Active or Pending Issues Requiring Follow-up: Please discontinue Eliquis until you see your PCP.     Advanced Directives:   [X] Full code  [ ] DNR  [ ] Hospice    Discharge Diagnoses: Gastrointestinal Bleed          Mr. Figueroa is an 84yo man history of A-fib on Eliquis, hypertension, hyperlipidemia, Davis's esophagus, BPH, and dementia who presented wtih 1 day of rectal bleeding. The patient had a prior colonoscopy on 2/2/24 with Dr. London at [facility]. A polyp was identified in the ascending colon and was resected with ablation. The patient tolerated the procedure well and returned home the same day. On 2/6, the patient resumed his eliquis; on 2/9 the patient began to experience repeated episodes of bloody diarrhea with a syncopal episode. Admitted to [facility] on 2/9/24. CTA of the Abdomen/Pelvis was performed on admission, demonstrated active arterial colonic hemorrhage localized the hepatic flexure which correlates to his previous polyp removal on 2/2. IR was consulted and deferred embolization for GI management. GI was consulted and recommended admission to MICU for close hemodynamic monitoring, patient then underwent colonoscopy with endoscopic clipping of the ulcer identified at the hepatic flexure. Was briefly hypotensive requiring pressors for a day then was transferred to floors after pressures improved. On 2/10 diet was advanced to clears, and to regular diet on 2/11. Anticoagulation was held during his hospitalization. On 2/12 patient was deemed stable for discharge home with established follow up. Extensive goals of care discussion, will plan to hold eliquis until follow up with PCP and can restart then. Patient and spouse expressed understanding of care plan.       Important Medication Changes and Reason: HOLD eliquis in setting of GI bleed    Active or Pending Issues Requiring Follow-up: Please discontinue Eliquis until you see your PCP. Please trend CBC outpatient     Advanced Directives:   [X] Full code  [ ] DNR  [ ] Hospice    Discharge Diagnoses: Gastrointestinal Bleed          Mr. Figueroa is an 84yo man history of A-fib on Eliquis, hypertension, hyperlipidemia, Davis's esophagus, BPH, and dementia who presented wtih 1 day of rectal bleeding. The patient had a prior colonoscopy on 2/2/24 with Dr. London at [facility]. A polyp was identified in the ascending colon and was resected with ablation. The patient tolerated the procedure well and returned home the same day. On 2/6, the patient resumed his eliquis; on 2/9 the patient began to experience repeated episodes of bloody diarrhea with a syncopal episode. Admitted to [facility] on 2/9/24. CTA of the Abdomen/Pelvis was performed on admission, demonstrated active arterial colonic hemorrhage localized the hepatic flexure which correlates to his previous polyp removal on 2/2. IR was consulted and deferred embolization for GI management. GI was consulted and recommended admission to MICU for close hemodynamic monitoring, patient then underwent colonoscopy with endoscopic clipping of the ulcer identified at the hepatic flexure. Was hypotensive requiring pressors due to Hemorrhagic shock then was transferred to floors after pressures improved. On 2/10 diet was advanced to clears, and to regular diet on 2/11. Anticoagulation was held during his hospitalization. On 2/12 patient was deemed stable for discharge home with established follow up. Extensive goals of care discussion, will plan to hold eliquis until follow up with PCP and can restart then. The risk of stroke during the hold was also discussed with wife, she verbalized understanding.     Important Medication Changes and Reason: HOLD eliquis in setting of GI bleed    Active or Pending Issues Requiring Follow-up: Please discontinue Eliquis until you see your PCP. Please trend CBC outpatient     Advanced Directives:   [X] Full code  [ ] DNR  [ ] Hospice    Discharge Diagnoses: Gastrointestinal Bleed

## 2024-02-12 NOTE — PROGRESS NOTE ADULT - SUBJECTIVE AND OBJECTIVE BOX
Patient is a 83y old  Male who presents with a chief complaint of GI Bleed (12 Feb 2024 07:04)      SUBJECTIVE / OVERNIGHT EVENTS: NAOE. No bloody BMs. Oriented to person and place only.     MEDICATIONS  (STANDING):  metoprolol succinate ER 75 milliGRAM(s) Oral daily  pantoprazole    Tablet 40 milliGRAM(s) Oral two times a day  QUEtiapine 12.5 milliGRAM(s) Oral at bedtime    MEDICATIONS  (PRN):      CAPILLARY BLOOD GLUCOSE        I&O's Summary    11 Feb 2024 07:01  -  12 Feb 2024 07:00  --------------------------------------------------------  IN: 200 mL / OUT: 800 mL / NET: -600 mL        Vital Signs Last 24 Hrs  T(C): 36.9 (12 Feb 2024 13:00), Max: 37.1 (11 Feb 2024 22:19)  T(F): 98.4 (12 Feb 2024 13:00), Max: 98.8 (11 Feb 2024 22:19)  HR: 80 (12 Feb 2024 13:00) (80 - 99)  BP: 129/75 (12 Feb 2024 13:00) (128/60 - 150/72)  BP(mean): --  RR: 17 (12 Feb 2024 13:00) (17 - 18)  SpO2: 100% (12 Feb 2024 13:00) (97% - 100%)    Parameters below as of 12 Feb 2024 13:00  Patient On (Oxygen Delivery Method): room air        PHYSICAL EXAM:  GENERAL: NAD, well-developed, well-nourished  HEAD: Atraumatic, Normocephalic  EYES: EOMI, PERRLA, conjunctiva and sclera clear  NECK: Supple, No JVD  CHEST/LUNG: Clear to auscultation bilaterally; No wheezes or crackles  HEART: Normal S1/S2; Regular rate and rhythm; No murmurs, rubs, or gallops  ABDOMEN: Soft, Nontender, Nondistended; Bowel sounds present  EXTREMITIES: 2+ Peripheral Pulses; No clubbing, cyanosis, or edema  PSYCH: A&Ox2, person and place only   NEUROLOGY: no focal neurologic deficit  SKIN: No rashes or lesions    LABS:                        8.6    8.12  )-----------( 232      ( 12 Feb 2024 06:02 )             26.1      02-12    141  |  105  |  8   ----------------------------<  83  3.7   |  26  |  0.89    Ca    8.3<L>      12 Feb 2024 06:02  Phos  3.3     02-12  Mg     2.00     02-12    TPro  5.3<L>  /  Alb  3.2<L>  /  TBili  0.4  /  DBili  x   /  AST  28  /  ALT  15  /  AlkPhos  54  02-12    PT/INR - ( 11 Feb 2024 10:27 )   PT: 12.5 sec;   INR: 1.11 ratio         PTT - ( 11 Feb 2024 10:27 )  PTT:26.1 sec

## 2024-02-12 NOTE — DISCHARGE NOTE NURSING/CASE MANAGEMENT/SOCIAL WORK - NSDCFUADDAPPT_GEN_ALL_CORE_FT
APPTS ARE READY TO BE MADE: [X] YES    Best Family or Patient Contact (if needed):  Spouse Leena Figueroa [phone]  Additional Information about above appointments (if needed):    1: Gastroenterology at [facility], 1 month (as above)  2:   3:     Other comments or requests:

## 2024-02-12 NOTE — PROGRESS NOTE ADULT - ASSESSMENT
84 y/o M with A-fib on Eliquis (last dose 8:30AM today), hypertension, hyperlipidemia, Davis's esophagus, BPH, presents with rectal bleeding for 1 day. Patient underwent colonoscopy with EMR of 5cm polyp in ascending colon with Dr. London at [facility] on 2/2. Resection and retrieval were complete. Ablation of visible vessel and margin with soft coag using hot biopsy forcep. Purastat (6 mL) was applied to prevent delayed bleeding. CTA positive for active arterial bleed at hepatic flexure. Patient brought to MICU for prep overnight and colonoscopy     #LGIB  - CTA with positive active a. bleed at hepatic flexure   - IR consulted, plan for colonoscopy first to manage bleed  - off AC now for 24 hours, s/p Kcentra   - S/p colonoscopy with visualization of post EMR site with clean based ulcer with pigmented spot and vessel - two clips placed; no further bleeding and hbg stable     Recommendations:  - hbg has been stable; patient sid PO diet   - would continue to hold AC if no contraindication   - trend hbg and keep >7    Note incomplete until signed by attending     Pushpa Pritchard MD  Gastroenterology/Hepatology Fellow, PGY-4  Please contact via TEAMS    NON-URGENT CONSULTS:  Please email [email or web address] 84 y/o M with A-fib on Eliquis (last dose 8:30AM today), hypertension, hyperlipidemia, Davis's esophagus, BPH, presents with rectal bleeding for 1 day. Patient underwent colonoscopy with EMR of 5cm polyp in ascending colon with Dr. London at [facility] on 2/2. Resection and retrieval were complete. Ablation of visible vessel and margin with soft coag using hot biopsy forcep. Purastat (6 mL) was applied to prevent delayed bleeding. CTA positive for active arterial bleed at hepatic flexure. Patient brought to MICU for prep overnight and colonoscopy     #LGIB  - CTA with positive active a. bleed at hepatic flexure   - IR consulted, plan for colonoscopy first to manage bleed  - off AC now for 24 hours, s/p Kcentra   - S/p colonoscopy with visualization of post EMR site with clean based ulcer with pigmented spot and vessel - two clips placed; no further bleeding and hbg stable     Recommendations:  - hbg has been stable; patient sid PO diet   - per primary team, plan to send patient home off AC and f/u with outpatient PCP to restart  - trend hbg and keep >7    GI to sign off at this time. Please call back with any further questions or concerns.     Pushpa Pritchard MD  Gastroenterology/Hepatology Fellow, PGY-4  Please contact via TEAMS    NON-URGENT CONSULTS:  Please email [email or web address] 84 y/o M with A-fib on Eliquis, hypertension, hyperlipidemia, Davis's esophagus, BPH, presents with rectal bleeding for 1 day. Patient underwent colonoscopy with EMR of 5cm polyp in ascending colon with Dr. London at [facility] on 2/2. Resection and retrieval were complete. Ablation of visible vessel and margin with soft coag using hot biopsy forcep. Purastat (6 mL) was applied to prevent delayed bleeding. CTA positive for active arterial bleed at hepatic flexure. Patient brought to MICU for prep overnight and colonoscopy     #LGIB  - CTA with positive active a. bleed at hepatic flexure   - IR consulted, plan for colonoscopy first to manage bleed  - off AC now for 24 hours, s/p Kcentra   - S/p colonoscopy with visualization of post EMR site with clean based ulcer with pigmented spot and vessel - two clips placed; no further bleeding and hbg stable     Recommendations:  - hbg has been stable; patient sid PO diet   - per primary team, plan to send patient home off AC and f/u with outpatient PCP to restart  - trend hbg and keep >7    GI to sign off at this time. Please call back with any further questions or concerns.     Pushpa Pritchard MD  Gastroenterology/Hepatology Fellow, PGY-4  Please contact via TEAMS    NON-URGENT CONSULTS:  Please email [email or web address]

## 2024-02-12 NOTE — PROGRESS NOTE ADULT - PROBLEM SELECTOR PLAN 6
DVT ppx: SCDs iso recent GIB  Diet: CLD, advance as tolerated  Dispo: Home pending clinical course  Ethics: Full Code DVT ppx: SCDs iso recent GIB  Diet: CLD, advance as tolerated  Dispo: Home pending CBC  Ethics: Full Code

## 2024-02-12 NOTE — DISCHARGE NOTE PROVIDER - NSDCMRMEDTOKEN_GEN_ALL_CORE_FT
allopurinol 100 mg oral tablet: 0.5 tab(s) orally once a day  amLODIPine 5 mg oral tablet: 1 tab(s) orally once a day  atorvastatin 20 mg oral tablet: 1 tab(s) orally once a day (at bedtime)  Coq10: 1 tab PO daily- Last dose 1/25/24  Folic acid: 1 tab PO daily  Krill Oil: 1 cap PO daily- Last dose 1/25/24  metoprolol succinate 25 mg oral capsule, extended release: 1 cap(s) orally 3 times a day  Multiple Vitamins oral tablet: 1 tab(s) orally once a day  omeprazole 40 mg oral delayed release capsule: 1 cap(s) orally once a day  Super B complex: 1 tab PO daily- Last dose 1/25/24  Tumeric: 1 tablet PO daily-last dose 1/25/24  Vitamin D: 50, 000 iu PO daily - Wednesdays

## 2024-02-12 NOTE — CHART NOTE - NSCHARTNOTESELECT_GEN_ALL_CORE
Code RUPA
IR follow up note/Event Note
micu transfer/Transfer Note
Care Plan Discussion/Event Note
GI/Event Note
MAR Accept

## 2024-02-12 NOTE — DISCHARGE NOTE PROVIDER - PROVIDER TOKENS
FREE:[LAST:[Gotti],FIRST:[Jose Ramon],PHONE:[phone]],FAX:[(   )    -],ADDRESS:[[address] [place] [address]],FOLLOWUP:[1 week],ESTABLISHEDPATIENT:[T]]

## 2024-02-12 NOTE — PROGRESS NOTE ADULT - SUBJECTIVE AND OBJECTIVE BOX
Progress Note   Pushpa Montanez PGY4- GI/Hep    SUBJECTIVE: Patient seen and examined at bedside.     OBJECTIVE:    VITAL SIGNS:  ICU Vital Signs Last 24 Hrs  T(C): 36.8 (12 Feb 2024 04:58), Max: 37.1 (11 Feb 2024 23:00)  T(F): 98.2 (12 Feb 2024 04:58), Max: 98.8 (11 Feb 2024 23:00)  HR: 85 (12 Feb 2024 04:58) (74 - 99)  BP: 139/60 (12 Feb 2024 04:58) (128/60 - 150/72)  BP(mean): --  ABP: --  ABP(mean): --  RR: 18 (12 Feb 2024 04:58) (17 - 18)  SpO2: 97% (12 Feb 2024 04:58) (97% - 100%)    O2 Parameters below as of 12 Feb 2024 04:58  Patient On (Oxygen Delivery Method): room air              02-10 @ 07:01 - 02-11 @ 07:00  --------------------------------------------------------  IN: 0 mL / OUT: 1125 mL / NET: -1125 mL    02-11 @ 07:01  - 02-12 @ 06:04  --------------------------------------------------------  IN: 200 mL / OUT: 800 mL / NET: -600 mL        PHYSICAL EXAM:    General: NAD  HEENT: NC/AT  Neck: supple  Respiratory: Regular RR, no increase in WOB  Cardiovascular: RRR  Abdomen: soft, NT/ND; +BS x4  Extremities: WWP, 2+ peripheral pulses b/l  Skin: normal color and turgor; no rash  Neurological: A&OX    MEDICATIONS:  MEDICATIONS  (STANDING):  metoprolol succinate ER 75 milliGRAM(s) Oral daily  pantoprazole    Tablet 40 milliGRAM(s) Oral two times a day  QUEtiapine 12.5 milliGRAM(s) Oral at bedtime    MEDICATIONS  (PRN):      ALLERGIES:  Allergies    strawberry (Stomach Upset)  penicillins (Unknown)    Intolerances        LABS:                        8.8    8.16  )-----------( 207      ( 11 Feb 2024 10:27 )             26.3     02-11    142  |  107  |  9   ----------------------------<  74  4.4   |  22  |  0.82    Ca    8.2<L>      11 Feb 2024 06:30  Phos  3.0     02-11  Mg     2.00     02-11    TPro  5.4<L>  /  Alb  3.1<L>  /  TBili  0.5  /  DBili  x   /  AST  30  /  ALT  16  /  AlkPhos  57  02-11    PT/INR - ( 11 Feb 2024 10:27 )   PT: 12.5 sec;   INR: 1.11 ratio         PTT - ( 11 Feb 2024 10:27 )  PTT:26.1 sec  Urinalysis Basic - ( 11 Feb 2024 06:30 )    Color: x / Appearance: x / SG: x / pH: x  Gluc: 74 mg/dL / Ketone: x  / Bili: x / Urobili: x   Blood: x / Protein: x / Nitrite: x   Leuk Esterase: x / RBC: x / WBC x   Sq Epi: x / Non Sq Epi: x / Bacteria: x         Progress Note   Pushpa Montanez PGY4- GI/Hep    SUBJECTIVE: Patient seen and examined at bedside.   - feels well   - no further bloody stool per patient     OBJECTIVE:    VITAL SIGNS:  ICU Vital Signs Last 24 Hrs  T(C): 36.8 (12 Feb 2024 04:58), Max: 37.1 (11 Feb 2024 23:00)  T(F): 98.2 (12 Feb 2024 04:58), Max: 98.8 (11 Feb 2024 23:00)  HR: 85 (12 Feb 2024 04:58) (74 - 99)  BP: 139/60 (12 Feb 2024 04:58) (128/60 - 150/72)  BP(mean): --  ABP: --  ABP(mean): --  RR: 18 (12 Feb 2024 04:58) (17 - 18)  SpO2: 97% (12 Feb 2024 04:58) (97% - 100%)    O2 Parameters below as of 12 Feb 2024 04:58  Patient On (Oxygen Delivery Method): room air              02-10 @ 07:01  -  02-11 @ 07:00  --------------------------------------------------------  IN: 0 mL / OUT: 1125 mL / NET: -1125 mL    02-11 @ 07:01  -  02-12 @ 06:04  --------------------------------------------------------  IN: 200 mL / OUT: 800 mL / NET: -600 mL        PHYSICAL EXAM:    General: NAD  HEENT: NC/AT  Neck: supple  Respiratory: Regular RR, no increase in WOB  Cardiovascular: RRR  Abdomen: soft, NT/ND; +BS x4  Extremities: WWP, 2+ peripheral pulses b/l  Skin: normal color and turgor; no rash  Neurological: A&OX2    MEDICATIONS:  MEDICATIONS  (STANDING):  metoprolol succinate ER 75 milliGRAM(s) Oral daily  pantoprazole    Tablet 40 milliGRAM(s) Oral two times a day  QUEtiapine 12.5 milliGRAM(s) Oral at bedtime    MEDICATIONS  (PRN):      ALLERGIES:  Allergies    strawberry (Stomach Upset)  penicillins (Unknown)    Intolerances        LABS:                        8.8    8.16  )-----------( 207      ( 11 Feb 2024 10:27 )             26.3     02-11    142  |  107  |  9   ----------------------------<  74  4.4   |  22  |  0.82    Ca    8.2<L>      11 Feb 2024 06:30  Phos  3.0     02-11  Mg     2.00     02-11    TPro  5.4<L>  /  Alb  3.1<L>  /  TBili  0.5  /  DBili  x   /  AST  30  /  ALT  16  /  AlkPhos  57  02-11    PT/INR - ( 11 Feb 2024 10:27 )   PT: 12.5 sec;   INR: 1.11 ratio         PTT - ( 11 Feb 2024 10:27 )  PTT:26.1 sec  Urinalysis Basic - ( 11 Feb 2024 06:30 )    Color: x / Appearance: x / SG: x / pH: x  Gluc: 74 mg/dL / Ketone: x  / Bili: x / Urobili: x   Blood: x / Protein: x / Nitrite: x   Leuk Esterase: x / RBC: x / WBC x   Sq Epi: x / Non Sq Epi: x / Bacteria: x

## 2024-02-12 NOTE — DISCHARGE NOTE PROVIDER - NSDCFUADDAPPT_GEN_ALL_CORE_FT
APPTS ARE READY TO BE MADE: [ ] YES    Best Family or Patient Contact (if needed):  Spouse Leena Figueroa [phone]  Additional Information about above appointments (if needed):    1:   2:   3:     Other comments or requests:    APPTS ARE READY TO BE MADE: [X] YES    Best Family or Patient Contact (if needed):  Spouse Leena Figueroa [phone]  Additional Information about above appointments (if needed):    1: Gastroenterology at [facility], 1 month (as above)  2:   3:     Other comments or requests:    APPTS ARE READY TO BE MADE: [X] YES    Best Family or Patient Contact (if needed):  Spouse Leena Figueroa [phone]  Additional Information about above appointments (if needed):    1: Gastroenterology at [facility], 1 month (as above)  2: Primary Doctor in 1 week for Hemoglobin monitoring and then in 2 weeks to evaluate need for anticoagulation.  3:     Other comments or requests:    APPTS ARE READY TO BE MADE: [X] YES    Best Family or Patient Contact (if needed):  Spouse Leena Figueroa [phone]  Additional Information about above appointments (if needed):    1: Gastroenterology at [facility], 1 month (as above)  2: Primary Doctor in 1 week for Hemoglobin monitoring and then in 2 weeks to evaluate need for anticoagulation.  3:     Other comments or requests:       Provided patient with provider referral information, however patient prefers to schedule the appointments on their own.

## 2024-02-12 NOTE — DISCHARGE NOTE PROVIDER - NSDCCAREPROVSEEN_GEN_ALL_CORE_FT
Team 8 [facility] Attending Dr. Gin Angel  [facility] Medicine Team 8 - Attending Dr. Mosley

## 2024-02-12 NOTE — PROGRESS NOTE ADULT - ASSESSMENT
The patient is an 82yo man history of A-fib on Eliquis, hypertension, hyperlipidemia, Davis's esophagus, BPH who presented wtih 1 day of rectal bleeding following resumption of Eliquis s/p recent polyp removal. Now s/p colonoscopy with endoscopic clipping of colonic ulcer transferred to floors for continued care.    The patient is an 84yo man history of A-fib on Eliquis, hypertension, hyperlipidemia, Davis's esophagus, BPH who presented wtih 1 day of rectal bleeding following resumption of Eliquis s/p recent polyp removal. Now s/p colonoscopy with endoscopic clipping of colonic ulcer transferred to floors for continued care. Risk benefit discussion with GI and family, will plan to hold eliquis on discharge and have him follow up in 1-2 weeks with PCP to discuss resuming eliquis.

## 2024-02-12 NOTE — DISCHARGE NOTE PROVIDER - NSFOLLOWUPCLINICS_GEN_ALL_ED_FT
Gastroenterology at [facility]  Gastroenterology  [address] [place], [place] [address]  Phone: [phone]  Fax:   Follow Up Time: 1 month

## 2024-02-12 NOTE — DISCHARGE NOTE PROVIDER - NSDCCPCAREPLAN_GEN_ALL_CORE_FT
PRINCIPAL DISCHARGE DIAGNOSIS  Diagnosis: Gastrointestinal bleed  Assessment and Plan of Treatment: While you were hospitalized, you were treated for a gastrointestinal bleed. You underwent a procedure called a colonoscopy that identified an ulcer in your colon, that was determined to be the cause of your bleeding. It is advised that until your next appointment with your primary care provider, that you avoid taking your blood thinner medicines.   Do not take aspirin or other anti-inflammatory medicines, such as naproxen (Aleve) or ibuprofen (Advil, Motrin), without talking to your doctor first. Please seek medical care if your bleeding returns, of if you feel weak or lightheaded.     PRINCIPAL DISCHARGE DIAGNOSIS  Diagnosis: Gastrointestinal bleed  Assessment and Plan of Treatment: While you were hospitalized, you were treated for a gastrointestinal bleed. You underwent a procedure called a colonoscopy that identified an ulcer in your colon, that was determined to be the cause of your bleeding. It is advised that until your next appointment with your primary care provider, that you avoid taking your blood thinner medicines.   Please do NOT take Eliquis until you follow up with your primary care physician, plan to restart it after visiting them. Do not take aspirin or other anti-inflammatory medicines, such as naproxen (Aleve) or ibuprofen (Advil, Motrin), without talking to your doctor first. Please seek medical care if your bleeding returns, of if you feel weak or lightheaded.     PRINCIPAL DISCHARGE DIAGNOSIS  Diagnosis: Hemorrhagic shock  Assessment and Plan of Treatment: While you were hospitalized, you were treated for a gastrointestinal bleed with low blood pressure. You underwent a procedure called a colonoscopy that identified an ulcer in your colon, that was determined to be the cause of your bleeding. It is advised that until your next appointment with your primary care provider within 1 week for Hemoglobin monitoring, that you avoid taking your blood thinner medicines.   Please do NOT take Eliquis until you follow up with your primary care physician, plan to restart it after visiting them. Do not take aspirin or other anti-inflammatory medicines, such as naproxen (Aleve) or ibuprofen (Advil, Motrin), without talking to your doctor first. Please seek medical care if your bleeding returns, of if you feel weak or lightheaded.      SECONDARY DISCHARGE DIAGNOSES  Diagnosis: Dementia  Assessment and Plan of Treatment: Continue follow up with primary care doctor    Diagnosis: Chronic atrial fibrillation  Assessment and Plan of Treatment: Please stop eliquis and do not restart until you get a repeat hemoglobin with your primary doctor within 1 week and cleared by Cardiology to restart.

## 2024-02-12 NOTE — DISCHARGE NOTE NURSING/CASE MANAGEMENT/SOCIAL WORK - PATIENT PORTAL LINK FT
You can access the FollowMyHealth Patient Portal offered by [facility] by registering at the following website: [email or web address]. By joining [email or web address]’s FollowMyHealth portal, you will also be able to view your health information using other applications (apps) compatible with our system.

## 2024-02-12 NOTE — DISCHARGE NOTE PROVIDER - ATTENDING ATTESTATION STATEMENT
Called patient left a message  to call her tomorrow afternoon   I have personally seen and examined the patient. I have collaborated with and supervised the

## 2024-02-12 NOTE — PROGRESS NOTE ADULT - PROBLEM SELECTOR PLAN 2
History of paroxysmal afib on Eliquis 5mg BID- held iso active GIB. Hasbled 3, Chadsvasc 3 History of paroxysmal afib on Eliquis 5mg BID- held iso active GIB. Hasbled 3, Chadsvasc 3  - hold eliquis  - Risk benefit discussion with family, agreed to hold and resume upon follow up with PCP

## 2024-02-12 NOTE — PROGRESS NOTE ADULT - PROBLEM SELECTOR PLAN 1
Active GIB at hepatic flexure correlating with prior polypectomy 2/2. Required 3u PRBCs, K Centra, briefly in MICU for hemodynamic monitoring due to hypotension req pressor suppport, now stabilized for transfer to floors following colonoscopy w/endoscopic clip placement. Diet advanced to CLD 2/10.   - Pantoprazole 40mg PO BID   - Baseline Hgb 13; admission Hgb 11.1, currently 9.5   - Trend H/H qd   - Transfusion goal of Hgb > 7, Plt > 50   - Hold home AC  - C/w Pantoprazole 40mg PO BID Active GIB at hepatic flexure correlating with prior polypectomy 2/2. Required 3u PRBCs, K Centra, briefly in MICU for hemodynamic monitoring due to hypotension req pressor suppport, now stabilized for transfer to floors following colonoscopy w/endoscopic clip placement. Diet advanced to CLD 2/10.   - Pantoprazole 40mg PO BID   - Baseline Hgb 13; admission Hgb 11.1, currently 9.5   - Trend H/H qd   - Transfusion goal of Hgb > 7, Plt > 50   - Hold home AC  - C/w Pantoprazole 40mg PO BID  - Recheck CBC and discharge if stable

## 2024-02-12 NOTE — PHYSICAL THERAPY INITIAL EVALUATION ADULT - PERTINENT HX OF CURRENT PROBLEM, REHAB EVAL
Patient is an 84yo M presented with 1 day of rectal bleeding following resumption of Eliquis s/p recent polyp removal. Now s/p colonoscopy with endoscopic clipping of colonic ulcer.

## 2024-02-12 NOTE — DISCHARGE NOTE PROVIDER - CARE PROVIDER_API CALL
Jose Ramon Gotti  [address] [place] [address]  Phone: [phone]  Fax: (   )    -  Established Patient  Follow Up Time: 1 week

## 2024-02-12 NOTE — DISCHARGE NOTE PROVIDER - NSDCCPTREATMENT_GEN_ALL_CORE_FT
PRINCIPAL PROCEDURE  Procedure: Colonoscopy  Findings and Treatment: 2/10/24  - Preparation of the colon was fair.  - Diverticulosis in the entire examined colon.  - A single (solitary) clean-based ulcer likely the site of EMR in the proximal transverse colon. Vissible vessel and pigmentded spot visualized. 2 clips wereplaced.   - No specimens collected      SECONDARY PROCEDURE  Procedure: CT abdomen pelvis  Findings and Treatment: 2/9/24  BOWEL: Active arterial extravasation in the colon about the hepatic   flexure (series 303-32 through 42) with pooling on venous exam.    Appendectomy. Colonic diverticulosis.  PERITONEUM: No ascites.  VESSELS: Atherosclerotic changes.  RETROPERITONEUM/LYMPH NODES: No lymphadenopathy.  ABDOMINAL WALL: Postsurgical change.  BONES: Degenerative changes.  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

## 2024-02-14 ENCOUNTER — NON-APPOINTMENT (OUTPATIENT)
Age: 84
End: 2024-02-14

## 2024-02-14 LAB — SURGICAL PATHOLOGY STUDY: SIGNIFICANT CHANGE UP

## 2024-02-15 LAB
HCT VFR BLD CALC: 30.1 %
HGB BLD-MCNC: 9.8 G/DL
MCHC RBC-ENTMCNC: 29.5 PG
MCHC RBC-ENTMCNC: 32.6 GM/DL
MCV RBC AUTO: 90.7 FL
PLATELET # BLD AUTO: 338 K/UL
RBC # BLD: 3.32 M/UL
RBC # FLD: 14.9 %
WBC # FLD AUTO: 9.62 K/UL

## 2024-03-04 NOTE — ED ADULT TRIAGE NOTE - ESI TRIAGE ACUITY LEVEL, MLM
2 QTc of 554  - Optimize K, Phos, Mg  - Repeat EKG in Am  - Avoid QT prolonging meds  - Likely prolonged due to amiodarone

## 2024-04-15 ENCOUNTER — RX RENEWAL (OUTPATIENT)
Age: 84
End: 2024-04-15

## 2024-04-15 RX ORDER — OMEPRAZOLE 40 MG/1
40 CAPSULE, DELAYED RELEASE ORAL
Qty: 30 | Refills: 3 | Status: ACTIVE | COMMUNITY
Start: 2022-05-10 | End: 1900-01-01

## 2024-11-12 ENCOUNTER — APPOINTMENT (OUTPATIENT)
Age: 84
End: 2024-11-12
Payer: OTHER MISCELLANEOUS

## 2024-11-12 VITALS
OXYGEN SATURATION: 98 % | WEIGHT: 177 LBS | RESPIRATION RATE: 15 BRPM | DIASTOLIC BLOOD PRESSURE: 78 MMHG | TEMPERATURE: 97.9 F | SYSTOLIC BLOOD PRESSURE: 134 MMHG | HEART RATE: 75 BPM | BODY MASS INDEX: 29.49 KG/M2 | HEIGHT: 65 IN

## 2024-11-12 DIAGNOSIS — J61 PNEUMOCONIOSIS DUE TO ASBESTOS AND OTHER MINERAL FIBERS: ICD-10-CM

## 2024-11-12 DIAGNOSIS — R41.89 OTHER SYMPTOMS AND SIGNS INVOLVING COGNITIVE FUNCTIONS AND AWARENESS: ICD-10-CM

## 2024-11-12 DIAGNOSIS — Z77.090 CONTACT WITH AND (SUSPECTED) EXPOSURE TO ASBESTOS: ICD-10-CM

## 2024-11-12 PROCEDURE — 99213 OFFICE O/P EST LOW 20 MIN: CPT | Mod: 25

## 2024-11-12 PROCEDURE — 94010 BREATHING CAPACITY TEST: CPT

## 2024-11-22 ENCOUNTER — OUTPATIENT (OUTPATIENT)
Dept: OUTPATIENT SERVICES | Facility: HOSPITAL | Age: 84
LOS: 1 days | End: 2024-11-22
Payer: COMMERCIAL

## 2024-11-22 ENCOUNTER — APPOINTMENT (OUTPATIENT)
Dept: CT IMAGING | Facility: IMAGING CENTER | Age: 84
End: 2024-11-22

## 2024-11-22 DIAGNOSIS — J61 PNEUMOCONIOSIS DUE TO ASBESTOS AND OTHER MINERAL FIBERS: ICD-10-CM

## 2024-11-22 DIAGNOSIS — Z90.49 ACQUIRED ABSENCE OF OTHER SPECIFIED PARTS OF DIGESTIVE TRACT: Chronic | ICD-10-CM

## 2024-11-22 DIAGNOSIS — R05.3 CHRONIC COUGH: ICD-10-CM

## 2024-11-22 DIAGNOSIS — Z98.890 OTHER SPECIFIED POSTPROCEDURAL STATES: Chronic | ICD-10-CM

## 2024-11-22 PROCEDURE — 71250 CT THORAX DX C-: CPT

## 2024-11-22 PROCEDURE — 71250 CT THORAX DX C-: CPT | Mod: 26

## 2024-11-26 ENCOUNTER — RX RENEWAL (OUTPATIENT)
Age: 84
End: 2024-11-26

## 2025-03-24 ENCOUNTER — RX RENEWAL (OUTPATIENT)
Age: 85
End: 2025-03-24

## 2025-07-03 NOTE — H&P PST ADULT - BIRTH SEX
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  As such we will schedule for right sided injection in 1 week. The risks, benefits, and alternatives to care were discussed with the patient and understanding expressed.  Male

## (undated) DEVICE — CONTAINER FORMALIN 10% 60ML

## (undated) DEVICE — CATH GL0-TIP SPR

## (undated) DEVICE — ATTACHMENT DISTAL 4X15MM

## (undated) DEVICE — BASIN EMESIS 10IN GRADUATED MAUVE

## (undated) DEVICE — BIOPSY FORCEP RADIAL JAW 4 STANDARD WITH NEEDLE

## (undated) DEVICE — DENTURE CUP PINK

## (undated) DEVICE — DRSG CURITY GAUZE SPONGE 4 X 4" 12-PLY NON-STERILE

## (undated) DEVICE — LINE BREATHE SAMPLNG

## (undated) DEVICE — PACK IV START WITH CHG

## (undated) DEVICE — DRSG 2X2

## (undated) DEVICE — SNARE POLYP HEXAGONAL SM 13X2.4X240

## (undated) DEVICE — ELCTR ECG CONDUCTIVE ADHESIVE

## (undated) DEVICE — CONTAINER FORMALIN 80ML YELLOW

## (undated) DEVICE — DRSG BANDAID 0.75X3"

## (undated) DEVICE — BIOPSY FORCEP COLD DISP

## (undated) DEVICE — LUBRICATING JELLY HR ONE SHOT 3G

## (undated) DEVICE — SALIVA EJECTOR (BLUE)

## (undated) DEVICE — Device

## (undated) DEVICE — TUBING SUCTION NONCONDUCTIVE 6MM X 12FT

## (undated) DEVICE — CLAMP BX HOT RAD JAW 3

## (undated) DEVICE — GOWN LG

## (undated) DEVICE — ELCTR GROUNDING PAD ADULT COVIDIEN

## (undated) DEVICE — TUBING IV SET GRAVITY 3Y 100" MACRO

## (undated) DEVICE — 3D MATRIX ADAPTER SYRINGE

## (undated) DEVICE — UNDERPAD LINEN SAVER 17 X 24"

## (undated) DEVICE — TUBING MEDI-VAC W MAXIGRIP CONNECTORS 1/4"X6'

## (undated) DEVICE — FORCEP RADIAL JAW 4 HOT BIOPSY DISP

## (undated) DEVICE — POLY TRAP ETRAP

## (undated) DEVICE — CATH IV SAFE BC 22G X 1" (BLUE)

## (undated) DEVICE — SNARE EXACTO COLD 9MMX230CM

## (undated) DEVICE — BITE BLOCK ADULT 20 X 27MM (GREEN)

## (undated) DEVICE — FACESHIELD FULL VISOR